# Patient Record
Sex: FEMALE | Race: WHITE
[De-identification: names, ages, dates, MRNs, and addresses within clinical notes are randomized per-mention and may not be internally consistent; named-entity substitution may affect disease eponyms.]

---

## 2017-07-23 NOTE — RAD
INDICATION:  Chest pain.



COMPARISON:  There are no prior studies available for comparison.



TECHNIQUE: Dual-energy PA  and lateral views of the chest were obtained.



FINDINGS:   The heart is within normal limits in size. Mediastinal and hilar contours

appear within normal limits.



The lungs are clear. No pleural effusion is present. 



IMPRESSION:  NO EVIDENCE FOR ACTIVE CARDIOPULMONARY DISEASE.

## 2017-07-23 NOTE — ED
Jeana PUENTES Thomas, scribed for Vicente Banda MD on 07/23/17 at 0944 .





Palpitations / Dysrhythmia





- HPI Summary


HPI Summary: 





The pt is an 82 y/o BIBA c/o palpitations that began this AM. Pt additionally c/

o chest heaviness, HA (severe, onset when she woke up), dizziness (minor), 

diaphoresis, near-syncope, fever (over the past week), and generalized malaise.

  Pt denies SOB and pedal edema (although she did have R foot swelling two 

weeks ago, resolved in the ED). She took a Tylenol at 03:00 today. She is not 

taking any blood thinners. PMHx: A-Fib, anemia, HTN, mitral/aortic valve 

regurgitation. PSHx: T&A. SHx: no smoking, no drinking, no illicit drugs. She 

claims recent tick bites. 





- History of Current Complaint


Chief Complaint: EDDysrhythmPalp


Time Seen by Provider: 07/23/17 09:29


Hx Obtained From: Patient


Onset/Duration: Sudden Onset - this AM when she woke up, Resolved - malaise is 

resolved in the ED


Timing: Constant


Character: Pounding


Aggravating: Nothing


Alleviating: Nothing


Associated Signs & Symptoms: Dizzy - minor, Syncope - near, Diaphoresis





- Allergy/Home Medications


Allergies/Adverse Reactions: 


 Allergies











Allergy/AdvReac Type Severity Reaction Status Date / Time


 


No Known Allergies Allergy   Verified 02/20/13 07:23











Home Medications: 


 Home Medications





Metoprolol Tartrate TAB* [Lopressor TAB*] 25 mg PO DAILY 07/23/17 [History 

Confirmed 07/23/17]











PMH/Surg Hx/FS Hx/Imm Hx


Previously Healthy: No


Endocrine/Hematology History: Reports: Hx Thyroid Disease - THYROIDECTOMY-  

HASHIMOTOS, Hx Anemia - SLIGHT IN THE PAST


Cardiovascular History: Reports: Hx Atrial Fibrillation, Hx Hypertension - ON 

MEDS, Hx Valvular Heart Disease - AORTIC VALVE LEAKAGE, Other Cardiovascular 

Problems/Disorders


Respiratory History: 


   Denies: Other Respiratory Problems/Disorders


Musculoskeletal History: 


   Denies: Hx Osteoporosis


Sensory History: Reports: Hx Contacts or Glasses - GLASSES


   Denies: Hx Hearing Aid


Opthamlomology History: Reports: Hx Contacts or Glasses - GLASSES


Neurological History: 


   Denies: Other Neuro Impairments/Disorders





- Cancer History


Hx Chemotherapy: No


Hx Radiation Therapy: No





- Surgical History


Surgery Procedure, Year, and Place: THYROID LEFT LOBECTOMY, 1990S, [facility] [place].  

UTERINE POLYP REMOVED, 1980S, [facility] [place].  1960S, [place], [place].  1930S, 

TONSILECTOMY, [facility] AND ADENOIDECTOMY


Hx Anesthesia Reactions: No


Infectious Disease History: No


Infectious Disease History: 


   Denies: Traveled Outside the [place] in Last 30 Days





- Family History


Known Family History: Positive: Other - NEG: per neighbor present in room, no MI





- Social History


Alcohol Use: None


Substance Use Type: Reports: None


Smoking Status (MU): Never Smoked Tobacco





Review of Systems


Positive: Fever - over the past week, Skin Diaphoresis, Other - POS: 

generalized malaise


Eyes: Negative


ENT: Negative


Positive: Palpitations - pounding, Chest Pain - heaviness


Respiratory: Negative


Negative: Shortness Of Breath


Gastrointestinal: Negative


Genitourinary: Negative


Musculoskeletal: Negative


Negative: Edema - pedal


Skin: Negative


Neurological: Other - POS: dizziness (minor)


Positive: Headache - severe, onset today, Syncope - near


Psychological: Normal


All Other Systems Reviewed And Are Negative: Yes





Physical Exam





- Summary


Physical Exam Summary: 





VITAL SIGNS: Reviewed. 


GENERAL:  Patient is a well developed and nourished female  who is lying 

comfortable in the stretcher.  Patient is not in any acute respiratory 

distress. 


HEAD AND FACE: No signs of trauma.  No ecchymosis, hematomas or skull 

depressions. No sinus tenderness. 


EYES: PERRLA, EOMI x 2, No injected conjunctiva, no nystagmus.


EARS: Hearing grossly intact. Ear canals and tympanic membranes are within 

normal limits. 


MOUTH: Oropharynx within normal limits. 


NECK: Supple, trachea is midline, no adenopathy, no JVD, no carotid bruit, no c-

spine tenderness, neck with full ROM.


CHEST: Symmetric, no tenderness at palpation 


LUNGS: Clear to auscultation bilaterally. No wheezing or crackles.


CVS: irregular rate and rhythm, S1 and S2 present, no murmurs or gallops 

appreciated. 


ABDOMEN: Soft, non-tender. No signs of distention. No rebound no guarding, and 

no masses palpated. Bowel sounds are normal. 


EXTREMITIES: FROM in all major joints, no edema, no cyanosis or clubbing.


NEURO: Alert and oriented x 3. No acute neurological deficits. Speech is normal 

and follows commands. 


SKIN: Dry and warm





Triage Information Reviewed: Yes


Vital Signs On Initial Exam: 


 Initial Vitals











Temp Pulse Resp Pulse Ox


 


 99.0 F   131   20   99 


 


 07/23/17 09:17  07/23/17 09:17  07/23/17 09:17  07/23/17 09:17











Vital Signs Reviewed: Yes





- Lisette Coma Scale


Coma Scale Total: 15





Diagnostics





- Vital Signs


 Vital Signs











  Temp Pulse Resp BP Pulse Ox


 


 07/23/17 09:22  99.5 F  109  16  104/51  95


 


 07/23/17 09:20   108    95


 


 07/23/17 09:19   109    94


 


 07/23/17 09:17  99.0 F  131  20   99














- Laboratory


Lab Results: 


 Lab Results











  07/23/17 07/23/17 07/23/17 Range/Units





  10:00 10:00 10:00 


 


WBC  9.1    (3.5-10.8)  10^3/ul


 


RBC  4.07    (4.0-5.4)  10^6/ul


 


Hgb  12.9    (12.0-16.0)  g/dl


 


Hct  39    (35-47)  %


 


MCV  96    (80-97)  fL


 


MCH  32 H    (27-31)  pg


 


MCHC  33    (31-36)  g/dl


 


RDW  14    (10.5-15)  %


 


Plt Count  239    (150-450)  10^3/ul


 


MPV  9    (7.4-10.4)  um3


 


Neut % (Auto)  73.5    (38-83)  %


 


Lymph % (Auto)  19.5 L    (25-47)  %


 


Mono % (Auto)  5.9    (1-9)  %


 


Eos % (Auto)  0.5    (0-6)  %


 


Baso % (Auto)  0.6    (0-2)  %


 


Absolute Neuts (auto)  6.7    (1.5-7.7)  10^3/ul


 


Absolute Lymphs (auto)  1.8    (1.0-4.8)  10^3/ul


 


Absolute Monos (auto)  0.5    (0-0.8)  10^3/ul


 


Absolute Eos (auto)  0    (0-0.6)  10^3/ul


 


Absolute Basos (auto)  0.1    (0-0.2)  10^3/ul


 


Absolute Nucleated RBC  0.01    10^3/ul


 


Nucleated RBC %  0.1    


 


INR (Anticoag Therapy)   0.97   (0.89-1.11)  


 


Sodium    138  (133-145)  mmol/L


 


Potassium    3.9  (3.5-5.0)  mmol/L


 


Chloride    103  (101-111)  mmol/L


 


Carbon Dioxide    30  (22-32)  mmol/L


 


Anion Gap    5  (2-11)  mmol/L


 


BUN    22  (6-24)  mg/dL


 


Creatinine    0.93  (0.51-0.95)  mg/dL


 


Est GFR ( Amer)    74.0  (>60)  


 


Est GFR (Non-Af Amer)    57.6  (>60)  


 


BUN/Creatinine Ratio    23.7 H  (8-20)  


 


Glucose    120 H  ()  mg/dL


 


Lactic Acid     (0.5-2.0)  mmol/L


 


Calcium    10.1  (8.6-10.3)  mg/dL


 


Magnesium    1.9  (1.9-2.7)  mg/dL


 


Total Bilirubin    0.70  (0.2-1.0)  mg/dL


 


AST    56 H  (13-39)  U/L


 


ALT    59 H  (7-52)  U/L


 


Alkaline Phosphatase    142 H  ()  U/L


 


Total Creatine Kinase    16  ()  U/L


 


CK-MB (CK-2)    2.2  (0.6-6.3)  ng/mL


 


Troponin I    0.06 H*  (<0.04)  ng/mL


 


B-Natriuretic Peptide    ( - 100) pg/mL


 


Total Protein    6.1 L  (6.4-8.9)  g/dL


 


Albumin    3.2  (3.2-5.2)  g/dL


 


Globulin    2.9  (2-4)  g/dL


 


Albumin/Globulin Ratio    1.1  (1-3)  


 


TSH    4.40  (0.34-5.60)  mcIU/mL














  07/23/17 07/23/17 Range/Units





  10:00 10:00 


 


WBC    (3.5-10.8)  10^3/ul


 


RBC    (4.0-5.4)  10^6/ul


 


Hgb    (12.0-16.0)  g/dl


 


Hct    (35-47)  %


 


MCV    (80-97)  fL


 


MCH    (27-31)  pg


 


MCHC    (31-36)  g/dl


 


RDW    (10.5-15)  %


 


Plt Count    (150-450)  10^3/ul


 


MPV    (7.4-10.4)  um3


 


Neut % (Auto)    (38-83)  %


 


Lymph % (Auto)    (25-47)  %


 


Mono % (Auto)    (1-9)  %


 


Eos % (Auto)    (0-6)  %


 


Baso % (Auto)    (0-2)  %


 


Absolute Neuts (auto)    (1.5-7.7)  10^3/ul


 


Absolute Lymphs (auto)    (1.0-4.8)  10^3/ul


 


Absolute Monos (auto)    (0-0.8)  10^3/ul


 


Absolute Eos (auto)    (0-0.6)  10^3/ul


 


Absolute Basos (auto)    (0-0.2)  10^3/ul


 


Absolute Nucleated RBC    10^3/ul


 


Nucleated RBC %    


 


INR (Anticoag Therapy)    (0.89-1.11)  


 


Sodium    (133-145)  mmol/L


 


Potassium    (3.5-5.0)  mmol/L


 


Chloride    (101-111)  mmol/L


 


Carbon Dioxide    (22-32)  mmol/L


 


Anion Gap    (2-11)  mmol/L


 


BUN    (6-24)  mg/dL


 


Creatinine    (0.51-0.95)  mg/dL


 


Est GFR ( Amer)    (>60)  


 


Est GFR (Non-Af Amer)    (>60)  


 


BUN/Creatinine Ratio    (8-20)  


 


Glucose    ()  mg/dL


 


Lactic Acid  1.1   (0.5-2.0)  mmol/L


 


Calcium    (8.6-10.3)  mg/dL


 


Magnesium    (1.9-2.7)  mg/dL


 


Total Bilirubin    (0.2-1.0)  mg/dL


 


AST    (13-39)  U/L


 


ALT    (7-52)  U/L


 


Alkaline Phosphatase    ()  U/L


 


Total Creatine Kinase    ()  U/L


 


CK-MB (CK-2)    (0.6-6.3)  ng/mL


 


Troponin I    (<0.04)  ng/mL


 


B-Natriuretic Peptide   131 H ( - 100) pg/mL


 


Total Protein    (6.4-8.9)  g/dL


 


Albumin    (3.2-5.2)  g/dL


 


Globulin    (2-4)  g/dL


 


Albumin/Globulin Ratio    (1-3)  


 


TSH    (0.34-5.60)  mcIU/mL











Result Diagrams: 


 07/23/17 10:00





 07/23/17 10:00


Lab Statement: Any lab studies that have been ordered have been reviewed, and 

results considered in the medical decision making process.





- Radiology


  ** CXR


Xray Interpretation: No Acute Changes - No evidence for active cardiopulmonary 

disease


Radiology Interpretation Completed By: Radiologist





- EKG


  ** 09:43


Cardiac Rate: Tachycardia - 115 BPM


EKG Interpretation: A-Fib with RVR





Course/Dx





- Course


Assessment/Plan: The pt is an 82 y/o BIBA c/o palpitations that began this AM. 

Pt additionally c/o chest heaviness, HA (severe, onset when she woke up), 

dizziness (minor), diaphoresis, near-syncope, fever (over the past week), and 

generalized malaise.  Pt denies SOB and pedal edema (although she did have R 

foot swelling two weeks ago, resolved in the ED). She took a Tylenol at 03:00 

today. She is not taking any blood thinners. PMHx: A-Fib, anemia, HTN, mitral/

aortic valve regurgitation. PSHx: T&A. SHx: no smoking, no drinking, no illicit 

drugs. She claims recent tick bites.  Tests are without significant 

abnormalities except Glucose 120, Troponin 0.6, and elevated LFTs.  CXR 

revealed no evidence for acute disease.  The patient is asymptomatic, therefore 

we did not treat with Cardizem. In the ED course her heart rate was  BPM. 

Because of her increased troponin, she was given aspirin. The patient is 

hemodynamically stable and A&Ox3.





- Diagnoses


Differential Diagnosis/HQI/PQRI: Positive: Other - Atrial Fibrillation, NSTEMI, 

ACS, CHF


Provider Diagnoses: 


 Paroxysmal atrial fibrillation with RVR, Increased troponin r/o ACS








- Physician Notifications


Discussed Care Of Patient With: Ej De Jesus


Time Discussed With Above Provider: 11:14


Instructed by Provider To: Other - Discussed patient care. He will accept the 

patient for admission to [facility] [place].





Discharge





- Discharge Plan


Condition: Fair


Disposition: ADMITTED TO [facility] documentation as recorded by the Jeana nava Thomas accurately reflects 

the service I personally performed and the decisions made by me, Vicente Banda MD.

## 2017-07-24 NOTE — ECHO
Patient:      KAYLIN GRAMAJO

[facility] Rec#:     [id number]            :          1933          

Date:         2017            Age:          83y                 

Account#:     [id number]          Height:       160.02 cm / 63.0 in

Accession#:   [id number]           Weight:       63.5 kg / 140.0 lbs

Sex:          F                     BSA:          1.66

Room#:        432                   

Admit Date#:  2017          

Type:         Inpatient

 

Referring:    HANNAH WHITE MD

Reading:      Leno Modi MD

Sonographer:  Silvana MainMATHEW

CC:           Chente Salmon MD

______________________________________________________________________

 

Transthoracic Echocardiogram

 

Indication:

A-fib

BP:           111/60

HR:           68

Rhythm:       NSR

 

Findings     

History:

HTN, Hashimoto's thyroiditis, PFO, recent tick bite. 

 

Technical Comments:

The study quality is good.  Completed at 1530. 

 

Left Ventricle:

The left ventricular chamber size is normal. Mild concentric left

ventricular hypertrophy is observed. Global left ventricular wall motion

and contractility are within normal limits. There is normal left

ventricular systolic function. The estimated ejection fraction is

55-60%.  Abnormal left ventricular diastolic function is observed.

Abnormal left ventricular diastolic filling is observed, consistent with

impaired relaxation.  

 

Left Atrium:

The left atrium is moderately dilated. 

 

Right Ventricle:

Moderator Band present. The right ventricular cavity size is normal. The

right ventricle wall thickness is mildly increased. The right

ventricular global systolic function is normal. 

 

Right Atrium:

The right atrium is mild to moderately dilated.  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

atrial septal aneurysm.  

 

Aortic Valve:

The aortic valve is trileaflet. The aortic valve leaflets are mildly

thickened. Systolic excursion of the aortic valve cusps is reduced.

There is mild aortic regurgitation.  There is borderline aortic stenosis

present. 

 

Mitral Valve:

There is mitral annular calcification. The mitral valve leaflets are

mildly thickened. There is mild mitral regurgitation.  There is no

evidence of mitral stenosis. 

 

Tricuspid Valve:

The tricuspid valve leaflets are normal.  There is mild tricuspid

regurgitation. No pulmonary hypertension is noted. There is no tricuspid

stenosis. 

 

Pulmonic Valve:

The pulmonic valve appears normal. There is mild pulmonic regurgitation.

 There is no pulmonic stenosis.  

 

Pericardium:

A trivial pericardial effusion is visualized. There are no signs of

significant hemodynamic compromise. A pericardial fat pad is visualized.

 

 

Aorta:

There is no dilatation of the ascending aorta. There is no dilatation of

the aortic arch. There is no dilation of the aortic root. 

 

Pulmonary Artery:

The main pulmonary artery appears normal. 

 

Venous:

The inferior vena cava is dilated.  There is a greater than 50%

respiratory change in the inferior vena cava dimension. 

 

Conclusions

Global left ventricular wall motion and contractility are within normal

limits.

There is normal left ventricular systolic function.

The estimated ejection fraction is 55-60%. 

Abnormal left ventricular diastolic filling is observed, consistent with

impaired relaxation. 

The right ventricular global systolic function is normal.

There is mild aortic regurgitation. 

There is borderline aortic stenosis present.

There is mild mitral regurgitation. 

There is mild tricuspid regurgitation.

No pulmonary hypertension is noted.

A trivial pericardial effusion is visualized.

There are no signs of significant hemodynamic compromise.

 

Measurements     

Name                    Value         Normal Range            

RVIDd (AP) 2D           3 cm          (0.9 - 2.6)             

RVDdMajor (2D)          3.6 cm        (2.2 - 4.4)             

RVAW (2D)               0.7 cm        (0.2 - 0.5)             

RAd ISD 4CH             5.3 cm        (3.4 - 4.9)             

RA (A4C)W               4.4 cm        (2.9 - 4.6)             

IVSd (2D)               1.1 cm        (0.6 - 1)               

LVPWd (2D)              1.1 cm        (0.6 - 1)               

LVIDd (2D)              4.7 cm        (3.6 - 5.4)             

LVIDs (2D)              3 cm          -                        

LV FS (2D)              36 %          (25 - 45)               

Aortic Annulus          1.9 cm        (1.4 - 2.6)             

Ao root diameter (2D)   3 cm          (2.1 - 3.5)             

Ascending Ao            2.9 cm        (2.1 - 3.4)             

Aortic arch             2.8 cm        (1.8 - 3.4)             

LA dimension (AP) 2D    3.3 cm        (2.3 - 3.8)             

LAd ISD 4CH             5.6 cm        (2.9 - 5.3)             

LA ISD 4CH W            4.8 cm        (2.5 - 4.5)             

 

Name                    Value         Normal Range            

LA ESV SP 4CH (A/L)     75 ml         -                        

LA ESV SP 2CH (A/L)     61 ml         -                        

LA ESV BP (A/L)         68 ml         -                        

LA ESV BP (A/L) index   41 ml/m2      -                        

LA ESV SP 4CH (MOD)     69 ml         -                        

LA ESV SP 2CH (MOD)     59 ml         -                        

 

Name                    Value         Normal Range            

MV E-wave Vmax          0.94 m/sec    -                        

MV deceleration time    216 msec      -                        

MV A-wave Vmax          1.23 m/sec    -                        

MV E:A ratio            0.76 ratio    -                        

LV septal e' Vmax       0.05 m/sec    -                        

LV lateral e' Vmax      0.06 m/sec    -                        

LV E:e' septal ratio    18.8 ratio    -                        

LV E:e' lateral ratio   15.67 ratio   -                        

 

Name                    Value         Normal Range            

AV Vmax                 2.04 m/sec    -                        

AV VTI                  43.7 cm       -                        

AV peak gradient        16.7 mmHg     -                        

AV mean gradient        9.12 mmHg     -                        

LVOT diameter           2 cm          -                        

LVOT Vmax               1.47 m/sec    -                        

LVOT VTI                33.1 cm       -                        

LVOT peak gradient      8.69 mmHg     -                        

LVOT mean gradient      5.83 mmHg     -                        

DOI (VTI)               0.78 ratio    -                        

PETRA (continuity Vmax)   2.26 cm2      -                        

PETRA (continuity VTI)    2.38 cm2      -                        

AR PHT                  342 msec      -                        

AR peak gradient        47.98 mmHg    -                        

 

Name                    Value         Normal Range            

TR Vmax                 2.2 m/sec     -                        

TR peak gradient        19 mmHg       -                        

RAP                     15 mmHg       -                        

RVSP                    34 mmHg       -                        

IVC diameter            2.5 cm        -                        

 

Name                    Value         Normal Range            

PV Vmax                 0.97 m/sec    -                        

PV peak gradient        3.73 mmHg     -                        

WI end-diastolic Vmax   0.89 m/sec    -                        

 

Electronically signed by: Leno Modi MD on 2017 16:41:20

## 2017-07-24 NOTE — PN
Subjective


Date of Service: 07/24/17


Interval History: 





No palpitations or chest pressure since admission.  No new c/o.  She never had 

theses sx's before.  Her headache seems to have gone also.





Objective


Active Medications: 








Acetaminophen (Tylenol Tab*)  650 mg PO Q4H PRN


   PRN Reason: Pain/fever


   Last Admin: 07/24/17 08:54 Dose:  650 mg


Amlodipine Besylate (Norvasc Tab*)  2.5 mg PO BEDTIME [facility]


   Last Admin: 07/23/17 19:47 Dose:  2.5 mg


Aspirin (Aspirin Low Dose Tab*)  81 mg PO BEDTIME [facility]


   Last Admin: 07/23/17 19:48 Dose:  81 mg


Enoxaparin Sodium (Lovenox(*))  40 mg SUBCUT Q24H [facility]


   Last Admin: 07/24/17 11:49 Dose:  40 mg


Metoprolol Tartrate (Lopressor Tab*)  25 mg PO DAILY [facility]


   Last Admin: 07/24/17 08:55 Dose:  25 mg


Multivitamins (Theragran Tab*)  1 tab PO BEDTIME [facility]


   Last Admin: 07/23/17 19:47 Dose:  1 tab








 Vital Signs











  07/23/17 07/23/17 07/23/17





  14:10 14:20 14:30


 


Temperature   


 


Pulse Rate   


 


Respiratory 10 12 65





Rate   


 


Blood Pressure   





(mmHg)   


 


O2 Sat by Pulse   





Oximetry   














  07/23/17 07/23/17 07/23/17





  14:40 14:50 15:00


 


Temperature   


 


Pulse Rate   


 


Respiratory 15 0 30





Rate   


 


Blood Pressure   





(mmHg)   


 


O2 Sat by Pulse   





Oximetry   














  07/23/17 07/23/17 07/23/17





  15:10 15:20 15:30


 


Temperature   


 


Pulse Rate   


 


Respiratory 17 15 21





Rate   


 


Blood Pressure   





(mmHg)   


 


O2 Sat by Pulse   





Oximetry   














  07/23/17 07/23/17 07/23/17





  15:31 15:40 15:50


 


Temperature 99.0 F  


 


Pulse Rate 70  


 


Respiratory 16 12 20





Rate   


 


Blood Pressure 97/53  





(mmHg)   


 


O2 Sat by Pulse 97  





Oximetry   














  07/23/17 07/23/17 07/23/17





  16:00 16:10 16:20


 


Temperature   


 


Pulse Rate   


 


Respiratory 8 3 5





Rate   


 


Blood Pressure   





(mmHg)   


 


O2 Sat by Pulse   





Oximetry   














  07/23/17 07/23/17 07/23/17





  16:30 16:40 16:50


 


Temperature   


 


Pulse Rate   


 


Respiratory 0 0 15





Rate   


 


Blood Pressure   





(mmHg)   


 


O2 Sat by Pulse   





Oximetry   














  07/23/17 07/23/17 07/23/17





  17:00 17:10 17:20


 


Temperature   


 


Pulse Rate   


 


Respiratory 11 17 13





Rate   


 


Blood Pressure   





(mmHg)   


 


O2 Sat by Pulse   





Oximetry   














  07/23/17 07/23/17 07/23/17





  17:30 17:40 17:50


 


Temperature   


 


Pulse Rate   


 


Respiratory 7 12 15





Rate   


 


Blood Pressure   





(mmHg)   


 


O2 Sat by Pulse   





Oximetry   














  07/23/17 07/23/17 07/23/17





  18:00 18:10 18:20


 


Temperature   


 


Pulse Rate   


 


Respiratory 0 0 0





Rate   


 


Blood Pressure   





(mmHg)   


 


O2 Sat by Pulse   





Oximetry   














  07/23/17 07/23/17 07/23/17





  18:30 18:40 18:50


 


Temperature   


 


Pulse Rate   


 


Respiratory 16 29 9





Rate   


 


Blood Pressure   





(mmHg)   


 


O2 Sat by Pulse   





Oximetry   














  07/23/17 07/23/17 07/23/17





  19:00 19:10 19:20


 


Temperature   


 


Pulse Rate   


 


Respiratory 9 27 4





Rate   


 


Blood Pressure   





(mmHg)   


 


O2 Sat by Pulse   





Oximetry   














  07/23/17 07/23/17 07/23/17





  19:29 19:30 19:40


 


Temperature 100.1 F  


 


Pulse Rate 78  


 


Respiratory 16 17 13





Rate   


 


Blood Pressure 105/47  





(mmHg)   


 


O2 Sat by Pulse 95  





Oximetry   














  07/23/17 07/23/17 07/23/17





  19:50 20:00 20:10


 


Temperature   


 


Pulse Rate   


 


Respiratory 17 18 13





Rate   


 


Blood Pressure   





(mmHg)   


 


O2 Sat by Pulse   





Oximetry   














  07/23/17 07/23/17 07/23/17





  20:20 20:30 20:39


 


Temperature   


 


Pulse Rate   78


 


Respiratory 28 0 





Rate   


 


Blood Pressure   105/47





(mmHg)   


 


O2 Sat by Pulse   





Oximetry   














  07/23/17 07/23/17 07/23/17





  20:40 20:43 20:50


 


Temperature   


 


Pulse Rate  85 


 


Respiratory 0  0





Rate   


 


Blood Pressure  108/48 





(mmHg)   


 


O2 Sat by Pulse   





Oximetry   














  07/23/17 07/23/17 07/23/17





  21:00 21:10 21:20


 


Temperature   


 


Pulse Rate 94  


 


Respiratory 13 18 20





Rate   


 


Blood Pressure 106/49  





(mmHg)   


 


O2 Sat by Pulse   





Oximetry   














  07/23/17 07/23/17 07/23/17





  21:30 21:40 21:50


 


Temperature   


 


Pulse Rate   


 


Respiratory 16 21 22





Rate   


 


Blood Pressure   





(mmHg)   


 


O2 Sat by Pulse   





Oximetry   














  07/23/17 07/23/17 07/23/17





  22:00 22:10 22:16


 


Temperature   99.7 F


 


Pulse Rate   


 


Respiratory 15 19 





Rate   


 


Blood Pressure   





(mmHg)   


 


O2 Sat by Pulse   





Oximetry   














  07/23/17 07/23/17 07/23/17





  22:20 22:30 22:40


 


Temperature   


 


Pulse Rate   


 


Respiratory 17 16 9





Rate   


 


Blood Pressure   





(mmHg)   


 


O2 Sat by Pulse   





Oximetry   














  07/23/17 07/23/17 07/23/17





  22:50 23:00 23:10


 


Temperature   


 


Pulse Rate   


 


Respiratory 14 38 16





Rate   


 


Blood Pressure   





(mmHg)   


 


O2 Sat by Pulse   





Oximetry   














  07/23/17 07/23/17 07/23/17





  23:20 23:30 23:40


 


Temperature   


 


Pulse Rate   


 


Respiratory 7 10 15





Rate   


 


Blood Pressure   





(mmHg)   


 


O2 Sat by Pulse   





Oximetry   














  07/23/17 07/23/17 07/23/17





  23:45 23:50 23:55


 


Temperature 99.4 F  


 


Pulse Rate 71  


 


Respiratory 16 18 17





Rate   


 


Blood Pressure 107/45  





(mmHg)   


 


O2 Sat by Pulse 93  





Oximetry   














  07/24/17 07/24/17 07/24/17





  00:00 00:10 00:20


 


Temperature   


 


Pulse Rate   


 


Respiratory 15 0 0





Rate   


 


Blood Pressure   





(mmHg)   


 


O2 Sat by Pulse   





Oximetry   














  07/24/17 07/24/17 07/24/17





  00:30 00:40 00:50


 


Temperature   


 


Pulse Rate   


 


Respiratory 0 0 0





Rate   


 


Blood Pressure   





(mmHg)   


 


O2 Sat by Pulse   





Oximetry   














  07/24/17 07/24/17 07/24/17





  01:00 01:10 01:20


 


Temperature   


 


Pulse Rate   


 


Respiratory 0 0 0





Rate   


 


Blood Pressure   





(mmHg)   


 


O2 Sat by Pulse   





Oximetry   














  07/24/17 07/24/17 07/24/17





  01:30 01:40 01:50


 


Temperature   


 


Pulse Rate   


 


Respiratory 13 17 2





Rate   


 


Blood Pressure   





(mmHg)   


 


O2 Sat by Pulse   





Oximetry   














  07/24/17 07/24/17 07/24/17





  02:00 02:10 02:20


 


Temperature   


 


Pulse Rate   


 


Respiratory 14 14 14





Rate   


 


Blood Pressure   





(mmHg)   


 


O2 Sat by Pulse   





Oximetry   














  07/24/17 07/24/17 07/24/17





  02:30 02:40 02:50


 


Temperature   


 


Pulse Rate   


 


Respiratory 12 4 0





Rate   


 


Blood Pressure   





(mmHg)   


 


O2 Sat by Pulse   





Oximetry   














  07/24/17 07/24/17 07/24/17





  03:00 03:10 03:20


 


Temperature   


 


Pulse Rate   


 


Respiratory 0 0 13





Rate   


 


Blood Pressure   





(mmHg)   


 


O2 Sat by Pulse   





Oximetry   














  07/24/17 07/24/17 07/24/17





  03:22 03:30 03:40


 


Temperature 98.8 F  


 


Pulse Rate 66  


 


Respiratory 16 12 16





Rate   


 


Blood Pressure 103/48  





(mmHg)   


 


O2 Sat by Pulse 96  





Oximetry   














  07/24/17 07/24/17 07/24/17





  03:50 04:00 04:10


 


Temperature   


 


Pulse Rate   


 


Respiratory 12 11 13





Rate   


 


Blood Pressure   





(mmHg)   


 


O2 Sat by Pulse   





Oximetry   














  07/24/17 07/24/17 07/24/17





  04:20 04:30 04:40


 


Temperature   


 


Pulse Rate   


 


Respiratory 14 11 10





Rate   


 


Blood Pressure   





(mmHg)   


 


O2 Sat by Pulse   





Oximetry   














  07/24/17 07/24/17 07/24/17





  04:50 05:00 05:10


 


Temperature   


 


Pulse Rate   


 


Respiratory 9 14 0





Rate   


 


Blood Pressure   





(mmHg)   


 


O2 Sat by Pulse   





Oximetry   














  07/24/17 07/24/17 07/24/17





  05:20 05:30 05:40


 


Temperature   


 


Pulse Rate   


 


Respiratory 15 16 0





Rate   


 


Blood Pressure   





(mmHg)   


 


O2 Sat by Pulse   





Oximetry   














  07/24/17 07/24/17 07/24/17





  05:50 06:00 06:10


 


Temperature   


 


Pulse Rate   


 


Respiratory 0 0 0





Rate   


 


Blood Pressure   





(mmHg)   


 


O2 Sat by Pulse   





Oximetry   














  07/24/17 07/24/17 07/24/17





  06:20 06:30 06:40


 


Temperature   


 


Pulse Rate   


 


Respiratory 0 0 4





Rate   


 


Blood Pressure   





(mmHg)   


 


O2 Sat by Pulse   





Oximetry   














  07/24/17 07/24/17 07/24/17





  06:50 07:00 07:10


 


Temperature   


 


Pulse Rate   


 


Respiratory 4 4 17





Rate   


 


Blood Pressure   





(mmHg)   


 


O2 Sat by Pulse   





Oximetry   














  07/24/17 07/24/17 07/24/17





  07:20 07:30 07:40


 


Temperature  98.0 F 


 


Pulse Rate  69 


 


Respiratory 14 18 14





Rate   


 


Blood Pressure  100/53 





(mmHg)   


 


O2 Sat by Pulse  94 





Oximetry   














  07/24/17 07/24/17 07/24/17





  07:50 08:00 08:10


 


Temperature   


 


Pulse Rate   


 


Respiratory 16 16 20





Rate   


 


Blood Pressure   





(mmHg)   


 


O2 Sat by Pulse   





Oximetry   














  07/24/17 07/24/17 07/24/17





  08:20 08:30 08:40


 


Temperature   


 


Pulse Rate   


 


Respiratory 21 12 19





Rate   


 


Blood Pressure   





(mmHg)   


 


O2 Sat by Pulse   





Oximetry   














  07/24/17 07/24/17 07/24/17





  08:50 09:00 09:10


 


Temperature   


 


Pulse Rate   


 


Respiratory 15 9 30





Rate   


 


Blood Pressure   





(mmHg)   


 


O2 Sat by Pulse   





Oximetry   














  07/24/17 07/24/17 07/24/17





  09:20 09:30 09:40


 


Temperature   


 


Pulse Rate   


 


Respiratory 13 10 11





Rate   


 


Blood Pressure   





(mmHg)   


 


O2 Sat by Pulse   





Oximetry   














  07/24/17 07/24/17 07/24/17





  09:50 10:00 10:10


 


Temperature   


 


Pulse Rate   


 


Respiratory 12 10 11





Rate   


 


Blood Pressure   





(mmHg)   


 


O2 Sat by Pulse   





Oximetry   














  07/24/17 07/24/17 07/24/17





  10:20 10:30 10:40


 


Temperature   


 


Pulse Rate   


 


Respiratory 12 1 13





Rate   


 


Blood Pressure   





(mmHg)   


 


O2 Sat by Pulse   





Oximetry   














  07/24/17 07/24/17 07/24/17





  10:50 11:00 11:06


 


Temperature   97.4 F


 


Pulse Rate   67


 


Respiratory 0 13 16





Rate   


 


Blood Pressure   111/60





(mmHg)   


 


O2 Sat by Pulse   99





Oximetry   














  07/24/17 07/24/17 07/24/17





  11:10 11:20 11:30


 


Temperature   


 


Pulse Rate   


 


Respiratory 14 15 17





Rate   


 


Blood Pressure   





(mmHg)   


 


O2 Sat by Pulse   





Oximetry   














  07/24/17





  11:40


 


Temperature 


 


Pulse Rate 


 


Respiratory 13





Rate 


 


Blood Pressure 





(mmHg) 


 


O2 Sat by Pulse 





Oximetry 











Oxygen Devices in Use Now: None


Appearance: Alert, sitting up in bed.  In good spirits.  Looks comfortable.


Eyes: No Scleral Icterus


Neck: NL Appearance and Movements; NL JVP, No Thyroid Enlargement, Masses


Respiratory: Symmetrical Chest Expansion and Respiratory Effort, Clear to 

Auscultation, Clear to Percussion


Cardiovascular: RRR, No Edema, - - 1-2/6 systolic murmur L>R


Abdominal: NL Sounds; No Tenderness; No Distention, No Hepatosplenomegaly


Extremities: No Edema, No Clubbing, Cyanosis


Skin: No Rash or Ulcers, No Nodules or Sclerosis


Neurological: Alert and Oriented x 3, NL Sensation


Result Diagrams: 


 07/23/17 10:00





 07/24/17 05:23


Additional Lab and Data: 


 Lab Results











  07/23/17 07/23/17 07/23/17 Range/Units





  10:00 10:00 10:00 


 


WBC  9.1    (3.5-10.8)  10^3/ul


 


RBC  4.07    (4.0-5.4)  10^6/ul


 


Hgb  12.9    (12.0-16.0)  g/dl


 


Hct  39    (35-47)  %


 


MCV  96    (80-97)  fL


 


MCH  32 H    (27-31)  pg


 


MCHC  33    (31-36)  g/dl


 


RDW  14    (10.5-15)  %


 


Plt Count  239    (150-450)  10^3/ul


 


MPV  9    (7.4-10.4)  um3


 


Neut % (Auto)  73.5    (38-83)  %


 


Lymph % (Auto)  19.5 L    (25-47)  %


 


Mono % (Auto)  5.9    (1-9)  %


 


Eos % (Auto)  0.5    (0-6)  %


 


Baso % (Auto)  0.6    (0-2)  %


 


Absolute Neuts (auto)  6.7    (1.5-7.7)  10^3/ul


 


Absolute Lymphs (auto)  1.8    (1.0-4.8)  10^3/ul


 


Absolute Monos (auto)  0.5    (0-0.8)  10^3/ul


 


Absolute Eos (auto)  0    (0-0.6)  10^3/ul


 


Absolute Basos (auto)  0.1    (0-0.2)  10^3/ul


 


Absolute Nucleated RBC  0.01    10^3/ul


 


Nucleated RBC %  0.1    


 


INR (Anticoag Therapy)   0.97   (0.89-1.11)  


 


Sodium    138  (133-145)  mmol/L


 


Potassium    3.9  (3.5-5.0)  mmol/L


 


Chloride    103  (101-111)  mmol/L


 


Carbon Dioxide    30  (22-32)  mmol/L


 


Anion Gap    5  (2-11)  mmol/L


 


BUN    22  (6-24)  mg/dL


 


Creatinine    0.93  (0.51-0.95)  mg/dL


 


Est GFR ( Amer)    74.0  (>60)  


 


Est GFR (Non-Af Amer)    57.6  (>60)  


 


BUN/Creatinine Ratio    23.7 H  (8-20)  


 


Glucose    120 H  ()  mg/dL


 


Lactic Acid     (0.5-2.0)  mmol/L


 


Calcium    10.1  (8.6-10.3)  mg/dL


 


Magnesium    1.9  (1.9-2.7)  mg/dL


 


Total Bilirubin    0.70  (0.2-1.0)  mg/dL


 


AST    56 H  (13-39)  U/L


 


ALT    59 H  (7-52)  U/L


 


Alkaline Phosphatase    142 H  ()  U/L


 


Total Creatine Kinase    16  ()  U/L


 


CK-MB (CK-2)    2.2  (0.6-6.3)  ng/mL


 


Troponin I    0.06 H*  (<0.04)  ng/mL


 


B-Natriuretic Peptide    ( - 100) pg/mL


 


Total Protein    6.1 L  (6.4-8.9)  g/dL


 


Albumin    3.2  (3.2-5.2)  g/dL


 


Globulin    2.9  (2-4)  g/dL


 


Albumin/Globulin Ratio    1.1  (1-3)  


 


TSH    4.40  (0.34-5.60)  mcIU/mL














  07/23/17 07/23/17 Range/Units





  10:00 10:00 


 


WBC    (3.5-10.8)  10^3/ul


 


RBC    (4.0-5.4)  10^6/ul


 


Hgb    (12.0-16.0)  g/dl


 


Hct    (35-47)  %


 


MCV    (80-97)  fL


 


MCH    (27-31)  pg


 


MCHC    (31-36)  g/dl


 


RDW    (10.5-15)  %


 


Plt Count    (150-450)  10^3/ul


 


MPV    (7.4-10.4)  um3


 


Neut % (Auto)    (38-83)  %


 


Lymph % (Auto)    (25-47)  %


 


Mono % (Auto)    (1-9)  %


 


Eos % (Auto)    (0-6)  %


 


Baso % (Auto)    (0-2)  %


 


Absolute Neuts (auto)    (1.5-7.7)  10^3/ul


 


Absolute Lymphs (auto)    (1.0-4.8)  10^3/ul


 


Absolute Monos (auto)    (0-0.8)  10^3/ul


 


Absolute Eos (auto)    (0-0.6)  10^3/ul


 


Absolute Basos (auto)    (0-0.2)  10^3/ul


 


Absolute Nucleated RBC    10^3/ul


 


Nucleated RBC %    


 


INR (Anticoag Therapy)    (0.89-1.11)  


 


Sodium    (133-145)  mmol/L


 


Potassium    (3.5-5.0)  mmol/L


 


Chloride    (101-111)  mmol/L


 


Carbon Dioxide    (22-32)  mmol/L


 


Anion Gap    (2-11)  mmol/L


 


BUN    (6-24)  mg/dL


 


Creatinine    (0.51-0.95)  mg/dL


 


Est GFR ( Amer)    (>60)  


 


Est GFR (Non-Af Amer)    (>60)  


 


BUN/Creatinine Ratio    (8-20)  


 


Glucose    ()  mg/dL


 


Lactic Acid  1.1   (0.5-2.0)  mmol/L


 


Calcium    (8.6-10.3)  mg/dL


 


Magnesium    (1.9-2.7)  mg/dL


 


Total Bilirubin    (0.2-1.0)  mg/dL


 


AST    (13-39)  U/L


 


ALT    (7-52)  U/L


 


Alkaline Phosphatase    ()  U/L


 


Total Creatine Kinase    ()  U/L


 


CK-MB (CK-2)    (0.6-6.3)  ng/mL


 


Troponin I    (<0.04)  ng/mL


 


B-Natriuretic Peptide   131 H ( - 100) pg/mL


 


Total Protein    (6.4-8.9)  g/dL


 


Albumin    (3.2-5.2)  g/dL


 


Globulin    (2-4)  g/dL


 


Albumin/Globulin Ratio    (1-3)  


 


TSH    (0.34-5.60)  mcIU/mL














Assess/Plan/Problems-Billing


Assessment: 











- Patient Problems


(1) PAF (paroxysmal atrial fibrillation)


Current Visit: Yes   Status: Acute   Code(s): I48.0 - PAROXYSMAL ATRIAL 

FIBRILLATION   SNOMED Code(s): 239489502


   Comment: TSH nl.  Echo showed nl LVEF, mild MR, mod LA dilatation.  ? atrial 

fib provoked by her febril illness.  Note temp 100.1 PM 7/23.     





(2) HTN (hypertension)


Current Visit: Yes   Status: Acute   Code(s): I10 - ESSENTIAL (PRIMARY) 

HYPERTENSION   SNOMED Code(s): 37133650


   Comment: Stop amlodipine due to relatively low BP.    


Status and Disposition: 





Discharge now.  Fup Teri Regalado.

## 2017-07-25 NOTE — DS
CC:  Dr. Abisai Salmon; Dr. Williamson *

 

DISCHARGE SUMMARY:

 

DATE OF ADMISSION:

 

DATE OF DISCHARGE:  07/24/17

 

HISTORY:  This 83-year-old woman presented with palpitations and chest 
heaviness. She had some flu-like symptoms.  She had noticed a tick under her 
right breast about July 3rd or July 4th, which she removed.  Symptoms started a 
few days before admission.  She had a Lyme serology sent, all of which was 
negative.  She had some palpitations on the morning of admission.  She had 
atrial fibrillation with a ventricular rate about 115.  She spontaneously 
converted in the emergency room.

 

She was monitored on telemetry.  She had no further episodes of arrhythmia.  
Her temperature on the evening of the first hospital day was 100.1.  It was 
under 100 after that.  She felt much better.  She had an echocardiogram which 
showed left ventricular ejection fraction was normal.  There was mild mitral 
regurgitation and moderate left atrial dilatation.  Her TSH was within normal 
limits.  Other routine labs were unremarkable other than the creatinine of 
1.04.  Her troponin was minimally elevated with a peak of 0.09.

 

She was told that she needed to be monitored as an outpatient to see if she has 
any other episodes of atrial fibrillation.  This one was likely precipitated by 
her febrile infectious illness, which is now resolved.

 

She was told to stop taking amlodipine as her blood pressure here had the 
highest diastolic was 60 and the highest systolic was 111 during her whole stay 
here.

 

FINAL DIAGNOSES:

1.  Paroxysmal atrial fibrillation.

2.  Febrile illness, likely viral.

3.  History of tick bite with negative Lyme serology.

4.  Hypertension. 



DISCHARGE MEDICATIONS:

1.  Multivitamin 1 daily.

2.  Calcium citrate with vitamin D 1 daily.

3.  Aspirin 81 mg h.s.

4.  Metoprolol tartrate 25 mg daily.

 

 [id number]/CPS #: [id number]

[facility]

## 2017-07-29 NOTE — RAD
HISTORY: Pulmonary embolism



TECHNIQUE: Multiple transverse and longitudinal ultrasound images were obtained of the

veins of the bilateral lower extremities using grayscale, color Doppler, and spectral

Doppler imaging with and without compression and with augmentation. 



FINDINGS:



VEINS: The common femoral vein, deep femoral vein, femoral vein and popliteal vein are

compressible throughout their course, with normal flow on color Doppler imaging and normal

response to augmentation on spectral Doppler imaging.



The deeper of the peroneal right peroneal artery exhibits accident color flow with

echogenic material in the lumen. Similarly, there is absence of flow in the left peroneal

artery is well.



SOFT TISSUES: Grossly normal. No large popliteal fossa cyst was identified.



IMPRESSION: 

1. No sonographic evidence of femoropopliteal deep vein thrombosis.

2. Occlusive thrombus is identified in the bilateral peroneal veins.

## 2017-07-29 NOTE — ED
Monica PUENTES Alfonso, scribed for Ricardo Rabago on 07/29/17 at 0458 .





Complex/Multi-Sys Presentation





- HPI Summary


HPI Summary: 


This patient is an 83 year old F BIBA to [facility] with a chief complaint of CP 

since 0230. The CC is described as heaviness. The pain awoke her from sleep. Pt 

rates the pain 1/10 in severity. Symptoms aggravated and alleviated by nothing. 

Patient took ASA and Tylenol PTA which did not relieve her symptoms. Pt reports 

upper back pain. PMHx of A-Fib and HTN.





- History Of Current Complaint


Chief Complaint: EDChestPainROMI


Time Seen by Provider: 07/29/17 04:49


Hx Obtained From: Patient


Onset/Duration: Sudden Onset, Lasting Hours - since 0230 this morning, Still 

Present


Timing: Constant


Severity Currently: Moderate


Severity Initially: Moderate


Location: Pain At: - Upper back and chest


Character: Pressure - Heaviness


Aggravating Factor(s): Nothing.


Alleviating Factor(s): Nothing.


Associated Signs And Symptoms: Positive: Back Pain - Upper





- Allergies/Home Medications


Allergies/Adverse Reactions: 


 Allergies











Allergy/AdvReac Type Severity Reaction Status Date / Time


 


No Known Allergies Allergy   Verified 02/20/13 07:23














PMH/Surg Hx/FS Hx/Imm Hx


Endocrine/Hematology History: Reports: Hx Thyroid Disease - THYROIDECTOMY-  

HASHIMOTOS, Hx Anemia - SLIGHT IN THE PAST


Cardiovascular History: Reports: Hx Atrial Fibrillation, Hx Hypertension - ON 

MEDS, Hx Valvular Heart Disease - AORTIC VALVE LEAKAGE, Other Cardiovascular 

Problems/Disorders


Respiratory History: 


   Denies: Other Respiratory Problems/Disorders


Musculoskeletal History: 


   Denies: Hx Osteoporosis


Sensory History: Reports: Hx Contacts or Glasses - GLASSES


   Denies: Hx Hearing Aid


Opthamlomology History: Reports: Hx Contacts or Glasses - GLASSES


Neurological History: 


   Denies: Other Neuro Impairments/Disorders





- Cancer History


Hx Chemotherapy: No


Hx Radiation Therapy: No





- Surgical History


Surgery Procedure, Year, and Place: [place] [facility] [place]  

[place] [facility] [place]  [place].  1930S, 

TONSILECTOMY, [facility] AND ADENOIDECTOMY


Hx Anesthesia Reactions: No


Infectious Disease History: No


Infectious Disease History: 


   Denies: Traveled Outside the [place] in Last 30 Days





- Family History


Known Family History: Positive: Other - NEG: per neighbor present in room, no MI





- Social History


Alcohol Use: None


Substance Use Type: Reports: None


Smoking Status (MU): Never Smoked Tobacco





Review of Systems


Positive: Chest Pain


Positive: Other - Positive upper back pain


All Other Systems Reviewed And Are Negative: Yes





Physical Exam


Triage Information Reviewed: Yes


Vital Signs On Initial Exam: 


 Initial Vitals











Temp Pulse Resp BP Pulse Ox


 


 97.3 F   62   14   126/84   97 


 


 07/29/17 04:46  07/29/17 04:46  07/29/17 04:46  07/29/17 04:46  07/29/17 04:46











Vital Signs Reviewed: Yes


Appearance: Positive: Well-Appearing, No Pain Distress


Skin: Positive: Warm, Skin Color Reflects Adequate Perfusion, Dry


Head/Face: Positive: Normal Head/Face Inspection


Eyes: Positive: EOMI, JEREMY


ENT: Positive: Normal ENT inspection


Neck: Positive: Supple, Nontender


Respiratory/Lung Sounds: Positive: Clear to Auscultation, Breath Sounds Present


Cardiovascular: Positive: Pulses are Symmetrical in both Upper and Lower 

Extremities, IRR


Abdomen Description: Positive: Nontender, Soft


Bowel Sounds: Positive: Present


Musculoskeletal: Positive: Normal, Strength/ROM Intact


Neurological: Positive: Normal, Sensory/Motor Intact, Alert, Oriented to Person 

Place, Time





- Lisette Coma Scale


Coma Scale Total: 15





Diagnostics





- Vital Signs


 Vital Signs











  Temp Pulse Resp BP Pulse Ox


 


 07/29/17 04:46  97.3 F  62  14  126/84  97














- Laboratory


Lab Results: 


 Lab Results











  07/29/17 07/29/17 07/29/17 Range/Units





  05:01 05:01 05:01 


 


WBC  9.9    (3.5-10.8)  10^3/ul


 


RBC  3.48 L    (4.0-5.4)  10^6/ul


 


Hgb  11.0 L    (12.0-16.0)  g/dl


 


Hct  33 L    (35-47)  %


 


MCV  95    (80-97)  fL


 


MCH  32 H    (27-31)  pg


 


MCHC  33    (31-36)  g/dl


 


RDW  14    (10.5-15)  %


 


Plt Count  324    (150-450)  10^3/ul


 


MPV  9    (7.4-10.4)  um3


 


Neut % (Auto)  56.4    (38-83)  %


 


Lymph % (Auto)  36.1    (25-47)  %


 


Mono % (Auto)  5.6    (1-9)  %


 


Eos % (Auto)  1.6    (0-6)  %


 


Baso % (Auto)  0.3    (0-2)  %


 


Absolute Neuts (auto)  5.6    (1.5-7.7)  10^3/ul


 


Absolute Lymphs (auto)  3.6    (1.0-4.8)  10^3/ul


 


Absolute Monos (auto)  0.6    (0-0.8)  10^3/ul


 


Absolute Eos (auto)  0.2    (0-0.6)  10^3/ul


 


Absolute Basos (auto)  0    (0-0.2)  10^3/ul


 


Absolute Nucleated RBC  0    10^3/ul


 


Nucleated RBC %  0    


 


INR (Anticoag Therapy)   0.99   (0.89-1.11)  


 


APTT   29.8   (26.0-36.3)  seconds


 


Sodium    131 L  (133-145)  mmol/L


 


Potassium    3.8  (3.5-5.0)  mmol/L


 


Chloride    99 L  (101-111)  mmol/L


 


Carbon Dioxide    26  (22-32)  mmol/L


 


Anion Gap    6  (2-11)  mmol/L


 


BUN    23  (6-24)  mg/dL


 


Creatinine    0.92  (0.51-0.95)  mg/dL


 


Est GFR ( Amer)    75.0  (>60)  


 


Est GFR (Non-Af Amer)    58.3  (>60)  


 


BUN/Creatinine Ratio    25.0 H  (8-20)  


 


Glucose    115 H  ()  mg/dL


 


Lactic Acid     (0.5-2.0)  mmol/L


 


Calcium    9.6  (8.6-10.3)  mg/dL


 


Total Bilirubin    0.50  (0.2-1.0)  mg/dL


 


AST    44 H  (13-39)  U/L


 


ALT    78 H  (7-52)  U/L


 


Alkaline Phosphatase    135 H  ()  U/L


 


Troponin I    0.02  (<0.04)  ng/mL


 


B-Natriuretic Peptide    ( - 100) pg/mL


 


Total Protein    6.0 L  (6.4-8.9)  g/dL


 


Albumin    3.0 L  (3.2-5.2)  g/dL


 


Globulin    3.0  (2-4)  g/dL


 


Albumin/Globulin Ratio    1.0  (1-3)  














  07/29/17 07/29/17 Range/Units





  05:01 05:01 


 


WBC    (3.5-10.8)  10^3/ul


 


RBC    (4.0-5.4)  10^6/ul


 


Hgb    (12.0-16.0)  g/dl


 


Hct    (35-47)  %


 


MCV    (80-97)  fL


 


MCH    (27-31)  pg


 


MCHC    (31-36)  g/dl


 


RDW    (10.5-15)  %


 


Plt Count    (150-450)  10^3/ul


 


MPV    (7.4-10.4)  um3


 


Neut % (Auto)    (38-83)  %


 


Lymph % (Auto)    (25-47)  %


 


Mono % (Auto)    (1-9)  %


 


Eos % (Auto)    (0-6)  %


 


Baso % (Auto)    (0-2)  %


 


Absolute Neuts (auto)    (1.5-7.7)  10^3/ul


 


Absolute Lymphs (auto)    (1.0-4.8)  10^3/ul


 


Absolute Monos (auto)    (0-0.8)  10^3/ul


 


Absolute Eos (auto)    (0-0.6)  10^3/ul


 


Absolute Basos (auto)    (0-0.2)  10^3/ul


 


Absolute Nucleated RBC    10^3/ul


 


Nucleated RBC %    


 


INR (Anticoag Therapy)    (0.89-1.11)  


 


APTT    (26.0-36.3)  seconds


 


Sodium    (133-145)  mmol/L


 


Potassium    (3.5-5.0)  mmol/L


 


Chloride    (101-111)  mmol/L


 


Carbon Dioxide    (22-32)  mmol/L


 


Anion Gap    (2-11)  mmol/L


 


BUN    (6-24)  mg/dL


 


Creatinine    (0.51-0.95)  mg/dL


 


Est GFR ( Amer)    (>60)  


 


Est GFR (Non-Af Amer)    (>60)  


 


BUN/Creatinine Ratio    (8-20)  


 


Glucose    ()  mg/dL


 


Lactic Acid  1.1   (0.5-2.0)  mmol/L


 


Calcium    (8.6-10.3)  mg/dL


 


Total Bilirubin    (0.2-1.0)  mg/dL


 


AST    (13-39)  U/L


 


ALT    (7-52)  U/L


 


Alkaline Phosphatase    ()  U/L


 


Troponin I    (<0.04)  ng/mL


 


B-Natriuretic Peptide   115 H ( - 100) pg/mL


 


Total Protein    (6.4-8.9)  g/dL


 


Albumin    (3.2-5.2)  g/dL


 


Globulin    (2-4)  g/dL


 


Albumin/Globulin Ratio    (1-3)  











Result Diagrams: 


 07/29/17 05:01





 07/29/17 05:01


Lab Statement: Any lab studies that have been ordered have been reviewed, and 

results considered in the medical decision making process.





- Radiology


  ** CXR


Radiology Interpretation Completed By: ED Physician - NAD





- CT


  ** CTA Chest


CT Interpretation Completed By: Radiologist - positive pulmonary emboli on the 

left. No infarct seen. Subcentimeter renal lesion on the left as above 

suspicious for a small renal cell carcinoma.





- EKG


  ** 0503


Cardiac Rate: NL - BPM 61


EKG Rhythm: Sinus Rhythm


EKG Interpretation: RBBB





Complex Multi-Symp Course/Dx


Assessment/Plan: 83 year old F BIBA to the ED with a CC of CP since 0230. The 

CC is described as heaviness. Patient took ASA and Tylenol PTA which did not 

relieve her symptoms. Pt reports upper back pain. CXR reveals NAD. CTA Chest 

reveals positive pulmonary emboli on the left. No infarct seen. Subcentimeter 

renal lesion on the left as above suspicious for a small renal cell carcinoma. 

We discussed patient care with Dr. Ponce (hospitalist) for admission. Patient 

will be admitted to Dr. Ponce. Pt is agreeable with this plan.





- Diagnoses


Provider Diagnoses: 


 Chest pain, Pulmonary emboli








- Physician Notifications


Discussed Care Of Patient With: Alexy Ponce


Time Discussed With Above Provider: 06:40


Instructed by Provider To: Other - Consulted Dr. Ponce (hospitalist) who 

agrees to admit.





Discharge





- Discharge Plan


Condition: Stable


Disposition: ADMITTED TO [facility]


Referrals: 


Abisai Salmon MD [Primary Care Provider] - 





The documentation as recorded by the Monica nava Alfonso accurately reflects 

the service I personally performed and the decisions made by me, Ricardo Rabago.

## 2017-07-29 NOTE — HP
CC:  Dr. Salmon; Dr. Williamson; Dr. Delcid

 

HISTORY AND PHYSICAL:

 

DATE OF ADMISSION:  17

 

PRIMARY CARE PROVIDER:  Dr. Salmon.

 

CARDIOLOGIST:  Dr. Williamson.

 

UROLOGIST:  Dr. Delcid.

 

CHIEF COMPLAINT:  Chest and back pain.

 

HISTORY OF PRESENT ILLNESS:  Ms. Burroughs is an 83-year-old female who is admitted from 17 [facility]
h 17 with atrial fibrillation and what was thought to be a viral illness.  The patient states 
that at the onset of that admission, she had awakened from sleep with rapid respiratory rate and fee
ling of fluttering in her chest.  She also had some chest heaviness.  When she presented to the [facility] room, she was found to be in rapid atrial fibrillation.  She converted on her own to normal si
nus rhythm.  She states over the next couple of days, she began to feel a little bit better and that
 she was not having as many body aches.  The patient states that yesterday afternoon, she was feelin
g well.  She states her appetite was improving.  She went to bed like usual.  She states that she wo
ke up on the morning of admission at approximately 2:30 a.m. with complaints of back ache.  She stat
es that she has had continued chest discomfort that comes and goes.  She denies any pain with deep b
reath.  She has noted a mild cough, but is not bringing up any sputum.  She states her fever is esse
ntially resolved.  The patient does admit to a recent trip to [place] earlier this month.

 

PAST MEDICAL HISTORY:

1.  Hypertension.

2.  History of PVCs.

3.  Osteopenia.

4.  History of Hashimoto's thyroiditis, status post left partial thyroidectomy.

 

PAST SURGICAL HISTORY:

1.  Left partial thyroidectomy.

2.  D and C.

3.  Uterine polyp removal.

4.  Bilateral cataract extractions.

 

MEDICATIONS:

1.  Aspirin 81 mg p.o. daily.

2.  Metoprolol tartrate 25 mg p.o. daily.

3.  Doxycycline 100 mg p.o. twice daily.

 

ALLERGIES:  No known drug allergies.

 

FAMILY HISTORY:  Mom  at age of 103, she had diabetes.  Dad  of heart disease at the age of 
87.

 

SOCIAL HISTORY:  The patient is a nonsmoker.  She does not drink alcohol.  She is a retired nurse.  
She is .  She has 4 children.  Her son, Graeme, home phone number is [phone] and cell phone [phone]
[phone], is her healthcare proxy.

 

REVIEW OF SYSTEMS:  The patient denies fevers.  She states her appetite has been poor though slowly 
improving.  She admits to the chest discomfort as above.  She denies any palpitations.  She notes th
at once she was in [place], her foot was swollen.  She states this is now resolved.  She admits to 
cough, but no sputum production.  No shortness of breath.  She has had some mild nausea.  No abdomin
al pain.  She does admit to being constipated.  No hematochezia.  No hematuria.  No dysuria.  No str
remedios-like symptoms.  No sudden changes in vision.  No dysphagia. She states her body aches had been i
mproving, but are slightly worse now than they were yesterday.  She does feel slightly dizzy.  She s
tates that she does have a rash that is fading that she notes on her legs and arms and back.  She de
nies any anxiety or depression.

 

                               PHYSICAL EXAMINATION

 

GENERAL:  The patient is a well-developed, elderly female, sitting up on the stretcher, in no acute 
distress.

 

VITAL SIGNS:  Blood pressure 141/68, pulse 74, respirations 18, temp 97.8, and O2 sat is 100% on suma
m air.

 

HEENT:  Pupils are equal, they are round.  There is evidence of prior cataract extractions.  Extraoc
ular muscles are intact.  Oropharynx is clear.  Oral mucosa is moist.  There is no submandibular, ce
rvical, or supraclavicular adenopathy.

 

PULMONARY:  Lungs are clear to auscultation bilaterally.

 

CARDIAC:  Normal S1 and S2.  Regular rate and rhythm.  I did not appreciate any murmurs.  There is r
ight greater than left lower extremity edema.

 

ABDOMEN:  Bowel sounds are present.  Abdomen is soft, nontender, nondistended.

 

MUSCULOSKELETAL:  There is no cyanosis or clubbing of the digits.  There is full active range of mot
ion of all 4 extremities.

 

NEURO:  Cranial nerves II through XII are grossly intact.  Sensation is intact to light touch throug
hout.  Strength is 5/5 and symmetric to both upper and lower extremities bilaterally.

 

PSYCH:  The patient is alert.  She is oriented x3.  Affect appears appropriate.

 

SKIN:  Warm.  It is dry.  There is a patchy erythematous rash on the patient's legs and faintly note
d on the patient's back.

 

 DIAGNOSTIC STUDIES/LAB DATA:  WBC 9.9, hemoglobin 11.0, hematocrit 33, platelets 324.  INR 0.99.  S
odium 131, potassium 3.8, chloride 99, CO2 26, BUN 23, creatinine 0.92, glucose 115, lactic acid 1.1
, calcium 9.6.  Bilirubin 0.5, AST 44, ALT 76, alk phos 135.  Troponin 0.02.  .  Albumin 3.0.

 

Chest x-ray:  No radiographic evidence for acute cardiopulmonary abnormality.

 

EKG:  Reveals normal sinus rhythm with inverted T waves in the anterior leads. Otherwise, no acute S
T-T wave abnormalities.

 

CTA chest, abdomen, and pelvis:  Nonocclusive pulmonary embolus involving the left lower lobar and s
egmental pulmonary arteries.  Along the anterior margin of the left kidney, there is a 1.1-cm enhanc
ing soft tissue nodule.  It is recommended to obtain further characterization on a nonemergent basis
 of either a contrast enhanced MRI or ultrasound examination of the left kidney.  Age-appropriate ch
ronic degenerative and iatrogenic changes are noted.

 

ASSESSMENT AND PLAN:  Ms. Burroughs is an 83-year-old female, who was recently admitted on 17 thro
[facility] 17 with rapid atrial fibrillation, who presents to the emergency room with complaints of b
ack and chest pain and was found to have pulmonary embolus in the left lung.

 

1.  Pulmonary embolism.  I am suspicious the patient may have in fact had a pulmonary embolism at th
e time of her prior hospitalization.  She does give history of a long distance travel to [facility] this month.  Additionally, she notes that her right leg/foot was swollen at the time of that t
rip.  It is possible the pulmonary embolism led to her atrial fibrillation that she presented with l
ast hospitalization as well as the mildly elevated troponins at that point.  Currently, the patient 
is completely hemodynamically stable.  She was started on heparin drip in the emergency room; bao caputo, this will be converted to Xarelto after the patient and I discussed options of anticoagulation in
cluding Coumadin versus Xarelto or Eliquis.  The patient will be monitored overnight on telemetry.  
At this point, I am going to hold off on ordering another echo as she did have an echocardiogram on 
17, which revealed global left ventricular wall motion and contractility to be within normal l
imits.  The ejection fraction was estimated to be 55% to 60%. The right ventricular global systolic 
function was normal.  No pulmonary hypertension was identified.  If the patient begins to develop he
modynamic compromise, I will go ahead and repeat echocardiogram.  Most likely, the patient will be a
ble to be discharged tomorrow morning.

2.  Hypertension.  The patient will be maintained on her usual dose of metoprolol.

3.  DVT prophylaxis.  According to the Adult Thrombosis Prophylaxis Risk Factor Assessment Guide, th
e patient has a total risk factor score of 7, making her high risk.  Xarelto will be utilized as her
 DVT prophylaxis.

4.  Code status is full.

 

TIME SPENT:  Sixty minutes was spent admitting this patient.

 

 

 

[id number]/[facility] #: [id number]

## 2017-07-29 NOTE — RAD
STUDY: CT angiography of the chest, abdomen and pelvis.



INDICATION: Chest and back pain that awoke the patient from sleep. Relevant surgical

history includes partial thyroidectomy.



COMPARISON: None.



TECHNIQUE: Multidetector CT angiography of the chest, abdomen and pelvis were obtained

from the lung apices to the ischial tuberosities after the intravenous injection of 100 mL

of Visipaque 320. Reformats were created in the coronal and sagittal planes. 3-D vascular

imaging was created from the source images and reviewed as well.



ANGIOGRAPHIC FINDINGS:



There is nonocclusive thrombus at the distal most portion of the left mainstem pulmonary

artery extending into the superior segmental artery. (Axial image 55 of 257). The

remaining pulmonary arteries appear to be free from thrombus.



There is mild calcification at the arch of the aorta. There is no pathologic aneurysmal

dilatation of the thoracic or abdominal aorta. Calcified atherosclerosis is seen at the

infrarenal abdominal aorta above the bifurcation extending into the iliac arteries.

In-line flow is recorded as far as the proximal femoral arteries bilaterally. The major

branch vessels from the abdominal aorta exhibits varying degrees of calcified

atherosclerosis at the origins but patency is maintained.



NON ANGIOGRAPHIC FINDINGS:



Chest: 



In keeping with the patient's reported surgical history the left lobe of the thyroid is

absent.



There is a small amount of abdominal plexuses along the anterior margin of the left lower

lobe adjacent to the heart. Otherwise the lungs are clear. There are no large pleural

effusions.



There is no mediastinal or hilar lymphadenopathy.



The heart is grossly normal in appearance.



Abdomen \T\ Pelvis: 



The liver, spleen, pancreas and adrenal glands are grossly normal in appearance. 



The gallbladder is normal.



Multiple fluid density cysts are seen bilaterally in the kidneys. Along the anterior

aspect of the mid-level left kidney (image 147) there is a solid enhancing soft tissue

structure extending from the cortex. The renal cortices otherwise enhance symmetrically

and promptly on the arterial phase imaging.



Evaluation of the gastrointestinal tract is limited in the absence of oral contrast. The

small and large bowel are not distended. Scattered rectosigmoid diverticula are noted.



There is no gross retroperitoneal or mesenteric lymphadenopathy.



The pelvic viscera is normal in appearance for the patient's age.



Multilevel degenerative changes of the thoracic and lumbar spine include loss of

intervertebral disc height.There are no sinister bone lesions.



IMPRESSION: 



1. Nonocclusive pulmonary embolus involving the left lower lobar and segmental pulmonary

arteries as described above.

2. Along the anterior margin of the left kidney there is a 1.1 cm enhancing soft tissue

nodule. I recommend further characterization on nonemergent basis with either

contrast-enhanced MRI or ultrasound examination of the left kidney.

3. Additional age-appropriate chronic, degenerative and iatrogenic changes as described in

the body the report.

## 2017-07-29 NOTE — RAD
INDICATION: Chest pain



COMPARISON: Most recent comparison chest x-rays dated July 23, 2017

 

TECHNIQUE: Single AP portable view of the chest was obtained.



FINDINGS: 



Image quality is compromised due to the relative inferiority of a portable chest x-ray.



There is mild cardiomegaly similar in appearance to the prior chest x-ray. The heart and

mediastinum otherwise exhibit normal contours.



The lungs are grossly clear. There is no evidence of a large pleural effusion.



Visualized bones are normal for the patient's age.



IMPRESSION:  No radiographic evidence for acute cardiopulmonary abnormality on this

portable chest x-ray.

## 2017-07-30 NOTE — PN
Objective


Active Medications: 








Acetaminophen (Tylenol Tab*)  650 mg PO Q4H PRN


   PRN Reason: FEVER/PAIN


   Last Admin: 07/30/17 08:39 Dose:  650 mg


Aspirin (Aspirin Low Dose Tab*)  81 mg PO BEDTIME [facility]


   Last Admin: 07/29/17 21:01 Dose:  81 mg


Doxycycline Hyclate (Vibramycin Cap(*))  100 mg PO BID [facility]


   Last Admin: 07/30/17 08:32 Dose:  100 mg


Metoprolol Tartrate (Lopressor Tab*)  25 mg PO DAILY [facility]


   Last Admin: 07/30/17 08:32 Dose:  25 mg


Rivaroxaban (Xarelto(*))  15 mg PO BID [facility]


   Last Admin: 07/30/17 08:32 Dose:  15 mg








 Vital Signs











  07/29/17 07/29/17 07/29/17





  14:00 15:00 15:15


 


Temperature   98.4 F


 


Pulse Rate   69


 


Respiratory 19 17 17





Rate   


 


Blood Pressure   127/63





(mmHg)   


 


O2 Sat by Pulse   99





Oximetry   














  07/29/17 07/29/17 07/29/17





  16:00 16:15 17:00


 


Temperature   


 


Pulse Rate   


 


Respiratory 14 13 44





Rate   


 


Blood Pressure   





(mmHg)   


 


O2 Sat by Pulse   





Oximetry   














  07/29/17 07/29/17 07/29/17





  18:00 19:00 19:33


 


Temperature   98.4 F


 


Pulse Rate   71


 


Respiratory 16 15 17





Rate   


 


Blood Pressure   151/74





(mmHg)   


 


O2 Sat by Pulse   100





Oximetry   














  07/29/17 07/29/17 07/29/17





  20:00 21:00 22:00


 


Temperature   


 


Pulse Rate   


 


Respiratory 14 17 15





Rate   


 


Blood Pressure   





(mmHg)   


 


O2 Sat by Pulse   





Oximetry   














  07/29/17 07/29/17 07/30/17





  23:00 23:29 00:00


 


Temperature  97.9 F 


 


Pulse Rate  68 


 


Respiratory 14 20 13





Rate   


 


Blood Pressure  145/61 





(mmHg)   


 


O2 Sat by Pulse  98 





Oximetry   














  07/30/17 07/30/17 07/30/17





  01:00 02:00 03:00


 


Temperature   


 


Pulse Rate   


 


Respiratory 14 19 2





Rate   


 


Blood Pressure   





(mmHg)   


 


O2 Sat by Pulse   





Oximetry   














  07/30/17 07/30/17 07/30/17





  03:45 04:00 05:00


 


Temperature 98.5 F  


 


Pulse Rate 68  


 


Respiratory 20 0 13





Rate   


 


Blood Pressure 139/60  





(mmHg)   


 


O2 Sat by Pulse   





Oximetry   














  07/30/17 07/30/17 07/30/17





  06:00 07:00 07:14


 


Temperature   97.7 F


 


Pulse Rate   63


 


Respiratory 12 18 16





Rate   


 


Blood Pressure   137/59





(mmHg)   


 


O2 Sat by Pulse   99





Oximetry   














  07/30/17 07/30/17 07/30/17





  08:00 09:00 10:00


 


Temperature   


 


Pulse Rate   


 


Respiratory 15 16 19





Rate   


 


Blood Pressure   





(mmHg)   


 


O2 Sat by Pulse   





Oximetry   














  07/30/17





  11:13


 


Temperature 98.4 F


 


Pulse Rate 57


 


Respiratory 16





Rate 


 


Blood Pressure 126/66





(mmHg) 


 


O2 Sat by Pulse 99





Oximetry 











Result Diagrams: 


 07/29/17 05:01





 07/29/17 05:01


Additional Lab and Data: 


 Lab Results











  07/29/17 07/29/17 07/29/17 Range/Units





  05:01 05:01 05:01 


 


WBC  9.9    (3.5-10.8)  10^3/ul


 


RBC  3.48 L    (4.0-5.4)  10^6/ul


 


Hgb  11.0 L    (12.0-16.0)  g/dl


 


Hct  33 L    (35-47)  %


 


MCV  95    (80-97)  fL


 


MCH  32 H    (27-31)  pg


 


MCHC  33    (31-36)  g/dl


 


RDW  14    (10.5-15)  %


 


Plt Count  324    (150-450)  10^3/ul


 


MPV  9    (7.4-10.4)  um3


 


Neut % (Auto)  56.4    (38-83)  %


 


Lymph % (Auto)  36.1    (25-47)  %


 


Mono % (Auto)  5.6    (1-9)  %


 


Eos % (Auto)  1.6    (0-6)  %


 


Baso % (Auto)  0.3    (0-2)  %


 


Absolute Neuts (auto)  5.6    (1.5-7.7)  10^3/ul


 


Absolute Lymphs (auto)  3.6    (1.0-4.8)  10^3/ul


 


Absolute Monos (auto)  0.6    (0-0.8)  10^3/ul


 


Absolute Eos (auto)  0.2    (0-0.6)  10^3/ul


 


Absolute Basos (auto)  0    (0-0.2)  10^3/ul


 


Absolute Nucleated RBC  0    10^3/ul


 


Nucleated RBC %  0    


 


INR (Anticoag Therapy)   0.99   (0.89-1.11)  


 


APTT   29.8   (26.0-36.3)  seconds


 


Sodium    131 L  (133-145)  mmol/L


 


Potassium    3.8  (3.5-5.0)  mmol/L


 


Chloride    99 L  (101-111)  mmol/L


 


Carbon Dioxide    26  (22-32)  mmol/L


 


Anion Gap    6  (2-11)  mmol/L


 


BUN    23  (6-24)  mg/dL


 


Creatinine    0.92  (0.51-0.95)  mg/dL


 


Est GFR ( Amer)    75.0  (>60)  


 


Est GFR (Non-Af Amer)    58.3  (>60)  


 


BUN/Creatinine Ratio    25.0 H  (8-20)  


 


Glucose    115 H  ()  mg/dL


 


Lactic Acid     (0.5-2.0)  mmol/L


 


Calcium    9.6  (8.6-10.3)  mg/dL


 


Total Bilirubin    0.50  (0.2-1.0)  mg/dL


 


AST    44 H  (13-39)  U/L


 


ALT    78 H  (7-52)  U/L


 


Alkaline Phosphatase    135 H  ()  U/L


 


Troponin I    0.02  (<0.04)  ng/mL


 


B-Natriuretic Peptide    ( - 100) pg/mL


 


Total Protein    6.0 L  (6.4-8.9)  g/dL


 


Albumin    3.0 L  (3.2-5.2)  g/dL


 


Globulin    3.0  (2-4)  g/dL


 


Albumin/Globulin Ratio    1.0  (1-3)  














  07/29/17 07/29/17 Range/Units





  05:01 05:01 


 


WBC    (3.5-10.8)  10^3/ul


 


RBC    (4.0-5.4)  10^6/ul


 


Hgb    (12.0-16.0)  g/dl


 


Hct    (35-47)  %


 


MCV    (80-97)  fL


 


MCH    (27-31)  pg


 


MCHC    (31-36)  g/dl


 


RDW    (10.5-15)  %


 


Plt Count    (150-450)  10^3/ul


 


MPV    (7.4-10.4)  um3


 


Neut % (Auto)    (38-83)  %


 


Lymph % (Auto)    (25-47)  %


 


Mono % (Auto)    (1-9)  %


 


Eos % (Auto)    (0-6)  %


 


Baso % (Auto)    (0-2)  %


 


Absolute Neuts (auto)    (1.5-7.7)  10^3/ul


 


Absolute Lymphs (auto)    (1.0-4.8)  10^3/ul


 


Absolute Monos (auto)    (0-0.8)  10^3/ul


 


Absolute Eos (auto)    (0-0.6)  10^3/ul


 


Absolute Basos (auto)    (0-0.2)  10^3/ul


 


Absolute Nucleated RBC    10^3/ul


 


Nucleated RBC %    


 


INR (Anticoag Therapy)    (0.89-1.11)  


 


APTT    (26.0-36.3)  seconds


 


Sodium    (133-145)  mmol/L


 


Potassium    (3.5-5.0)  mmol/L


 


Chloride    (101-111)  mmol/L


 


Carbon Dioxide    (22-32)  mmol/L


 


Anion Gap    (2-11)  mmol/L


 


BUN    (6-24)  mg/dL


 


Creatinine    (0.51-0.95)  mg/dL


 


Est GFR ( Amer)    (>60)  


 


Est GFR (Non-Af Amer)    (>60)  


 


BUN/Creatinine Ratio    (8-20)  


 


Glucose    ()  mg/dL


 


Lactic Acid  1.1   (0.5-2.0)  mmol/L


 


Calcium    (8.6-10.3)  mg/dL


 


Total Bilirubin    (0.2-1.0)  mg/dL


 


AST    (13-39)  U/L


 


ALT    (7-52)  U/L


 


Alkaline Phosphatase    ()  U/L


 


Troponin I    (<0.04)  ng/mL


 


B-Natriuretic Peptide   115 H ( - 100) pg/mL


 


Total Protein    (6.4-8.9)  g/dL


 


Albumin    (3.2-5.2)  g/dL


 


Globulin    (2-4)  g/dL


 


Albumin/Globulin Ratio    (1-3)  














Assess/Plan/Problems-Billing


Assessment: 











- Patient Problems


(1) Pulmonary embolism


Current Visit: Yes   Status: Acute   Code(s): I26.99 - OTHER PULMONARY EMBOLISM 

WITHOUT ACUTE COR PULMONALE   SNOMED Code(s): 57465390


   Comment: Will change to lovenox bridging to coumadin as both xarelto and 

elquis require prior authorizations. She will need INR on 8/1/17. We reviewed 

when/if she should come back to the hospital including worsened pain, SOB or 

hemoptysis. She was also found to have B/L below knee DVTs.

## 2017-07-31 NOTE — ED
Tala PUENTES Edward, scribed for Yudith Diaz MD on 07/31/17 at 0802 .





Neurological HPI





- HPI Summary


HPI Summary: 





84 y/o female BIBA c/o numbness and weakness in the L side of the face starting 

at 06:00 this morning. Pt woke up at 03:30 this morning. Pt states she is 

unable to close her L eye. No hearing deficiency, no weakness in upper 

extremities. PMHx AFIB, HTN, mild HLD. Pt lives alone. Earlier this month the 

pt had a HA and was dx with Lyme Disease, and has been on Doxy for 2 weeks. Pt 

was also recently admitted for PE and released yesterday. Past medications 

reviewed on visit. 





- History of Current Complaint


Stated Complaint: WEAKNESS, DIZZY


Hx Obtained From: Patient


Onset/Duration: Sudden Onset, Started hours ago - 06:00 this morning, Still 

Present


Timing: Constant


Neurological Deficit Location: Facial - L face


Character: Numbness/Tingling - L face, Other: - No hearing deficiency, no 

weakness in upper extremities





- Additional Pertinent History


Primary Care Physician: REID





- Allergy/Home Medications


Allergies/Adverse Reactions: 


 Allergies











Allergy/AdvReac Type Severity Reaction Status Date / Time


 


No Known Allergies Allergy   Verified 02/20/13 07:23














PMH/Surg Hx/FS Hx/Imm Hx


Previously Healthy: No


Endocrine/Hematology History: Reports: Hx Thyroid Disease - THYROIDECTOMY-  

HASHIMOTOS, Hx Anemia - SLIGHT IN THE PAST, Other Endocrine/Hematological 

Disorders


Cardiovascular History: Reports: Hx Atrial Fibrillation, Hx Deep Vein 

Thrombosis - 7/29/17, Hx Embolism - 7/29/17, Hx Hypercholesterolemia, Hx 

Hypertension - ON MEDS, Hx Valvular Heart Disease - AORTIC VALVE LEAKAGE, Other 

Cardiovascular Problems/Disorders - A fib/PE, Heart cath 07


Respiratory History: 


   Denies: Hx Asthma, Hx Chronic Obstructive Pulmonary Disease (COPD), Hx 

Pneumonia, Other Respiratory Problems/Disorders


GI History: 


   Denies: Hx Gastroesophageal Reflux Disease, Hx Gastrointestinal Bleed


 History: Reports: Other  Problems/Disorders - renal cysts


Musculoskeletal History: Reports: Hx Arthritis


   Denies: Hx Osteoporosis


Sensory History: Reports: Hx Cataracts, Hx Contacts or Glasses - GLASSES


   Denies: Hx Hearing Aid


Opthamlomology History: Reports: Hx Cataracts, Hx Contacts or Glasses - GLASSES


Neurological History: 


   Denies: Hx Migraine, Hx Seizures, Hx Transient Ischemic Attacks (TIA), Other 

Neuro Impairments/Disorders





- Cancer History


Hx Chemotherapy: No


Hx Radiation Therapy: No





- Surgical History


Surgery Procedure, Year, and Place: [place] [facility] [place]  

[place] [facility] [place].  [place], [place], [place].  1930S, 

TONSILECTOMY, [facility] AND ADENOIDECTOMY.  Heart cath 2007


Hx Anesthesia Reactions: No





- Family History


Known Family History: 


   Negative: Cardiac Disease - No MI





- Social History


Alcohol Use: None


Substance Use Type: Reports: None


Smoking Status (MU): Never Smoked Tobacco





Review of Systems


Constitutional: Negative


Eyes: Negative


ENT: Negative


Cardiovascular: Negative


Respiratory: Negative


Gastrointestinal: Negative


Genitourinary: Negative


Musculoskeletal: Negative


Skin: Negative


Neurological: Other - No weakness in upper extremities, no hearing deficiency


Positive: Numbness - L face


Psychological: Normal


All Other Systems Reviewed And Are Negative: Yes





Physical Exam


Triage Information Reviewed: Yes


Vital Signs On Initial Exam: 


 Initial Vitals











Temp Pulse Resp BP Pulse Ox


 


 97.8 F   72   20   148/71   98 


 


 07/31/17 07:55  07/31/17 07:55  07/31/17 07:55  07/31/17 07:55  07/31/17 07:55











Vital Signs Reviewed: Yes


Appearance: Positive: Well-Appearing, No Pain Distress


Skin: Positive: Warm, Skin Color Reflects Adequate Perfusion, Dry


Eyes: Positive: EOMI, JEREMY


ENT: Positive: Pharynx normal, TMs normal


Neck: Positive: Supple, Nontender


Respiratory/Lung Sounds: Positive: Clear to Auscultation, Breath Sounds 

Present.  Negative: Rales, Rhonchi, Wheezes


Cardiovascular: Positive: RRR, Other - No gallop.  Negative: Murmur, Rub


Abdomen Description: Positive: Nontender, Soft, Other: - No rebound.  Negative: 

Distended, Guarding


Bowel Sounds: Positive: Present


Musculoskeletal: Positive: Strength/ROM Intact.  Negative: Edema Left, Edema 

Right


Neurological: Positive: Sensory/Motor Intact, Alert, Oriented to Person Place, 

Time, CN Intact II-III


Psychiatric: Positive: Affect/Mood Appropriate





Diagnostics





- Vital Signs


 Vital Signs











  Temp Pulse Resp BP Pulse Ox


 


 07/31/17 08:09      98


 


 07/31/17 07:55  97.8 F  72  20  148/71  98














- Laboratory


Lab Results: 


 Lab Results











  07/31/17 07/31/17 07/31/17 Range/Units





  08:05 08:05 08:05 


 


WBC  8.9    (3.5-10.8)  10^3/ul


 


RBC  3.63 L    (4.0-5.4)  10^6/ul


 


Hgb  11.8 L    (12.0-16.0)  g/dl


 


Hct  35    (35-47)  %


 


MCV  96    (80-97)  fL


 


MCH  33 H    (27-31)  pg


 


MCHC  34    (31-36)  g/dl


 


RDW  14    (10.5-15)  %


 


Plt Count  376    (150-450)  10^3/ul


 


MPV  8    (7.4-10.4)  um3


 


Neut % (Auto)  43.1    (38-83)  %


 


Lymph % (Auto)  47.8 H    (25-47)  %


 


Mono % (Auto)  6.3    (1-9)  %


 


Eos % (Auto)  1.4    (0-6)  %


 


Baso % (Auto)  1.4    (0-2)  %


 


Absolute Neuts (auto)  3.9    (1.5-7.7)  10^3/ul


 


Absolute Lymphs (auto)  4.3    (1.0-4.8)  10^3/ul


 


Absolute Monos (auto)  0.6    (0-0.8)  10^3/ul


 


Absolute Eos (auto)  0.1    (0-0.6)  10^3/ul


 


Absolute Basos (auto)  0.1    (0-0.2)  10^3/ul


 


Absolute Nucleated RBC  0.01    10^3/ul


 


Nucleated RBC %  0.1    


 


INR (Anticoag Therapy)   1.24 H   (0.89-1.11)  


 


Sodium    132 L  (133-145)  mmol/L


 


Potassium    3.8  (3.5-5.0)  mmol/L


 


Chloride    100 L  (101-111)  mmol/L


 


Carbon Dioxide    27  (22-32)  mmol/L


 


Anion Gap    5  (2-11)  mmol/L


 


BUN    17  (6-24)  mg/dL


 


Creatinine    0.86  (0.51-0.95)  mg/dL


 


Est GFR ( Amer)    81.0  (>60)  


 


Est GFR (Non-Af Amer)    63.0  (>60)  


 


BUN/Creatinine Ratio    19.8  (8-20)  


 


Glucose    114 H  ()  mg/dL


 


Lactic Acid     (0.5-2.0)  mmol/L


 


Calcium    10.0  (8.6-10.3)  mg/dL


 


Total Bilirubin    0.50  (0.2-1.0)  mg/dL


 


AST    29  (13-39)  U/L


 


ALT    55 H  (7-52)  U/L


 


Alkaline Phosphatase    123 H  ()  U/L


 


Troponin I    0.01  (<0.04)  ng/mL


 


Total Protein    6.6  (6.4-8.9)  g/dL


 


Albumin    3.2  (3.2-5.2)  g/dL


 


Globulin    3.4  (2-4)  g/dL


 


Albumin/Globulin Ratio    0.9 L  (1-3)  


 


Urine Color     


 


Urine Appearance     


 


Urine pH     (5-9)  


 


Ur Specific Gravity     (1.010-1.030)  


 


Urine Protein     (Negative)  


 


Urine Ketones     (Negative)  


 


Urine Blood     (Negative)  


 


Urine Nitrate     (Negative)  


 


Urine Bilirubin     (Negative)  


 


Urine Urobilinogen     (Negative)  


 


Ur Leukocyte Esterase     (Negative)  


 


Urine Glucose     (Negative)  














  07/31/17 07/31/17 Range/Units





  08:05 09:55 


 


WBC    (3.5-10.8)  10^3/ul


 


RBC    (4.0-5.4)  10^6/ul


 


Hgb    (12.0-16.0)  g/dl


 


Hct    (35-47)  %


 


MCV    (80-97)  fL


 


MCH    (27-31)  pg


 


MCHC    (31-36)  g/dl


 


RDW    (10.5-15)  %


 


Plt Count    (150-450)  10^3/ul


 


MPV    (7.4-10.4)  um3


 


Neut % (Auto)    (38-83)  %


 


Lymph % (Auto)    (25-47)  %


 


Mono % (Auto)    (1-9)  %


 


Eos % (Auto)    (0-6)  %


 


Baso % (Auto)    (0-2)  %


 


Absolute Neuts (auto)    (1.5-7.7)  10^3/ul


 


Absolute Lymphs (auto)    (1.0-4.8)  10^3/ul


 


Absolute Monos (auto)    (0-0.8)  10^3/ul


 


Absolute Eos (auto)    (0-0.6)  10^3/ul


 


Absolute Basos (auto)    (0-0.2)  10^3/ul


 


Absolute Nucleated RBC    10^3/ul


 


Nucleated RBC %    


 


INR (Anticoag Therapy)    (0.89-1.11)  


 


Sodium    (133-145)  mmol/L


 


Potassium    (3.5-5.0)  mmol/L


 


Chloride    (101-111)  mmol/L


 


Carbon Dioxide    (22-32)  mmol/L


 


Anion Gap    (2-11)  mmol/L


 


BUN    (6-24)  mg/dL


 


Creatinine    (0.51-0.95)  mg/dL


 


Est GFR ( Amer)    (>60)  


 


Est GFR (Non-Af Amer)    (>60)  


 


BUN/Creatinine Ratio    (8-20)  


 


Glucose    ()  mg/dL


 


Lactic Acid  0.9   (0.5-2.0)  mmol/L


 


Calcium    (8.6-10.3)  mg/dL


 


Total Bilirubin    (0.2-1.0)  mg/dL


 


AST    (13-39)  U/L


 


ALT    (7-52)  U/L


 


Alkaline Phosphatase    ()  U/L


 


Troponin I    (<0.04)  ng/mL


 


Total Protein    (6.4-8.9)  g/dL


 


Albumin    (3.2-5.2)  g/dL


 


Globulin    (2-4)  g/dL


 


Albumin/Globulin Ratio    (1-3)  


 


Urine Color   Yellow  


 


Urine Appearance   Clear  


 


Urine pH   6.0  (5-9)  


 


Ur Specific Gravity   1.012  (1.010-1.030)  


 


Urine Protein   Negative  (Negative)  


 


Urine Ketones   Negative  (Negative)  


 


Urine Blood   Negative  (Negative)  


 


Urine Nitrate   Negative  (Negative)  


 


Urine Bilirubin   Negative  (Negative)  


 


Urine Urobilinogen   Negative  (Negative)  


 


Ur Leukocyte Esterase   Negative  (Negative)  


 


Urine Glucose   Negative  (Negative)  











Result Diagrams: 


 07/31/17 08:05





 07/31/17 08:05


Lab Statement: Any lab studies that have been ordered have been reviewed, and 

results considered in the medical decision making process.





- CT


  ** BRAIN CT


CT Interpretation: No Acute Changes - No intracranial mass or hemorrhage


CT Interpretation Completed By: Radiologist





- EKG


  ** 1


Cardiac Rate: NL


EKG Rhythm: Sinus Rhythm - @ 62 bpm


EKG Interpretation: 08:17 - T wave inversions, anterior leads


EKG Comparison: No Significant Change - From 07/29/17





- Additional Comments


Diagnostic Additional Comments: 





BRAIN MRI - NO ACUTE CHANGES - 1. No evidence for acute or subacute ischemia. 

Negative for suspicious intraosseous or


extra-axial lesions or mass effect. 2. Mild burden of nonspecific white matter 

hyperintensities on FLAIR/T2 series. While


nonspecific disease these lesions most likely represent foci of chronic small 

vessel


ischemic disease.





Interpreted by Radiologist.





NIH Scale





- NIH Scale


Level of Consciousness: Alert/Keenly Responsive


Ask Patient the Month and His/Her Age: Both Correct


Ask Pt to Open/Close Eyes and /Release Non-Paretic Hand: Both Correctly


Best Gaze (Only Horizontal Eye Movement): Normal


Visual Field Testing: No Visual Loss


Facial Paresis-Pt to Smile & Close Eyes or Grimace Symmetry: Minor Paralysis - 

L sided facial droop


Motor Function - Right Arm: No Drift-Holds 10 Seconds


Motor Function - Left Arm: No Drift-Holds 10 Seconds


Motor Function - Right Leg: No Drift-Holds 10 Seconds


Motor Function - Left Leg: No Drift-Holds 10 Seconds


Limb Ataxia-Must be out of Proportion to Weakness Present: Absent


Sensory (Use Pinprick to Test Arms/Legs/Trunk/Face): Normal


Best Language (Describe Picture, Name Items): No Aphasia


Extinction and Inattention: No Abnormality





Course/Dx





- Course


Assessment/Plan: Discussed patient care with Dr. Magaly Grey. Will wait on 

CT results to decide further assessment. Discussed CT results with Dr. Alaina Cortés, who suggests pt receive an MRI.  MRI neg for acute issues pt seen by 

Milana who advised pt to get artificial tears and asked that pt be prescribed 

acyclovir





- Diagnoses


Provider Diagnoses: 


 Bell's palsy








- Physician Notifications


Discussed Care Of Patient With: Alaina Cortés


Time Discussed With Above Provider: 13:00


Instructed by Provider To: MD Will See In ED





Discharge





- Discharge Plan


Condition: Stable


Disposition: HOME





The documentation as recorded by the Tala nava Edward accurately reflects the 

service I personally performed and the decisions made by me, Yudith Diaz MD.

## 2017-07-31 NOTE — RAD
Indication: Facial droop on the right.



CT of the brain was performed without IV contrast.



Ventricular structures are midline. No midline shift is noted. The extra-axial spaces are

unremarkable. There is no evidence of intracranial mass or hemorrhage. No other high or

low density lesions are identified.



Mastoid air cells and paranasal sinuses are otherwise unremarkable.



IMPRESSION: There is no intracranial mass or hemorrhage.

## 2017-07-31 NOTE — DS
CC:  Dr. Salmon *

 

DISCHARGE SUMMARY:

 

DATE OF ADMISSION:  07/29/17

 

DATE OF DISCHARGE:  07/30/17

 

PRIMARY CARE PROVIDER:  Dr. Salmon.

 

PRINCIPAL DIAGNOSES:

1.  Left-sided pulmonary embolism.

2.  Bilateral below knee deep venous thrombosis.

 

SECONDARY DIAGNOSES:

1.  Hypertension.

2.  Recent hospitalization where atrial fibrillation was identified.

3.  History of Hashimoto's thyroiditis.

 

DISCHARGE MEDICATIONS:

1.  Metoprolol tartrate 25 mg p.o. daily.

2.  Doxycycline 100 mg p.o. twice daily to complete the course of therapy 
started at home.

3.  Coumadin 5 mg p.o. daily.

4.  Lovenox 70 mg subcutaneous twice daily until INR is between 2 to 3.

 

Discontinued medications:

1.  Aspirin.

 

HOSPITAL COURSE:  Ms. Burroughs is an 83-year-old female who was recently admitted 
from 07/23/17 through 07/24/17 with what was felt to be a viral illness and 
atrial fibrillation.  At that time, the patient was ultimately discharged home 
to continue on her aspirin and metoprolol.  The patient represented to the 
emergency room on 07/29/17 with complaints of back pain as well as chest 
discomfort.  The patient underwent CTA of the chest which revealed a left-sided 
pulmonary embolism.  The patient was initially started on heparin drip, however
, after patient and I discussed her oral option, a decision was made to start 
her on Xarelto.  On the day of discharge, the patient is feeling well.  She 
continued to agree with treatment with Xarelto; however, when I checked with 
her pharmacy, it became clear that the patient will need a prior authorization 
for this.  The patient wished to be discharged to home from hospital, therefore
, she will be started on Lovenox 70 mg subcutaneously twice daily bridging to 
Coumadin.  The patient will be started on Coumadin 5 mg p.o. daily starting 
tonight.  The patient will need a followup INR on 08/01/17.  The patient has 
already an appointment scheduled with Dr. Coto for 08/02/17, and I have 
encouraged her to keep this appointment.  Of note, on admission, the patient's 
CTA also included the abdomen and pelvis.  The CT of the abdomen revealed a 1.1 
cm enhancing soft tissue nodule on the anterior margin of the left kidney.  It 
is recommended to have further characterization on an nonemergent basis with 
either a contrast enhanced MRI or an ultrasound examination of the left kidney 
to further evaluate this nodule.

 

On the day of discharge, the patient was awake, alert and oriented, sitting up 
in bed, appearing to be in no acute distress.  Her vital signs are stable with 
good blood pressure control and heart rate.  She is afebrile.  She is 99% on 
room air. The patient's cardiac exam revealed a normal S1 and S2 with a regular 
rate and rhythm.  Her lungs were clear to auscultation bilaterally.  The 
patient does have trace to 1+ bilateral lower extremity edema.  Again, the 
patient is stable for discharge to home today.

 

FOLLOWUP CONCERNS:  The patient is being discharged home today, 07/30/17.

 

ACTIVITY LEVEL:  As tolerated.

 

DIET:  Regular.

 

CONDITION ON DISCHARGE:  Stable.

 

TIME SPENT:  Thirty-five minutes was spent discharging this patient.

 

 [id number] #: [id number]

MTDD

## 2017-07-31 NOTE — RAD
Indication: LEFT facial droop.



Comparison: July 31, 2017 CT



Technique: LTG Exam Prep Platform Optima 1.5 Jayna [id number] with GEM suite.  MRI brain without contrast. 



Report: Diffusion series is negative for acute or subacute ischemia. Susceptibility series

is negative for stigmata of hemosiderin deposition to indicate previous hemorrhage.



Unremarkable cerebral sulci, ventricles, and basal cisterns for age. Normal variant mildly

prominent perivascular spaces. A few nonspecific periventricular and subcortical white

matter hyperintensities are noted at the bilateral cerebral hemispheres without mass

effect. No intra or extra-axial fluid collection evident.



Preserved major intracranial flow-voids.



Unremarkable orbital contents.



No suspicious calvarial or skull base lesions evident. Clear paranasal sinuses and mastoid

air spaces.



IMPRESSION: 

1. No evidence for acute or subacute ischemia. Negative for suspicious intraosseous or

extra-axial lesions or mass effect.

2. Mild burden of nonspecific white matter hyperintensities on FLAIR/T2 series. While

nonspecific disease these lesions most likely represent foci of chronic small vessel

ischemic disease.

## 2017-08-01 NOTE — CONS
CC:  Abisai Salmon MD, [facility] *

 

CONSULTATION REPORT:

 

DATE OF CONSULT:  17 - EMERGENCY DEPT

 

REQUESTING PHYSICIAN:  Dr. Yudith Diaz.

 

HISTORY OF PRESENT ILLNESS:  Yulia Burroughs is an 83-year-old woman with a 
history of hypertension, pulmonary embolism, atrial fibrillation, Hashimoto's 
thyroiditis status post partial thyroidectomy, osteopenia, D and C, uterine 
polyp removal, bilateral cataract repair who presents to hospital with left 
facial weakness.

 

Her history begins around 17 when she noticed a tick here in [facility] and removed it.  She then travelled to [place], [place] from 17 to 
17. She started noticing her right foot was swollen on that trip.  After 
she returned, she developed headaches behind and near her left ear in mid July.
  She occasionally had a slight dizzy, unsteady sensation.  She went to the 
emergency room on 17, 17 and was diagnosed with atrial fibrillation 
and possible virus. She after discharge developed multifocal rash and patches, 
the one on the back shaped a horseshoe and was started on doxycycline at 
[facility]. She was readmitted on 17 with findings of a 
pulmonary embolism in the left lower lobe and on her scan was also noted to 
have a 1.1 cm soft tissue nodule near her left kidney.

 

This morning, she awoke and she had weakness on the left side of her face 
noticing that her eye was not moving right and her mouth was not moving right 
and it was close to numb on the left hand side, she proceeded to come into the 
emergency room and is accompanied by her neighbor.

 

PAST MEDICAL/SURGICAL HISTORY:  Includes:

1.  Hypertension.

2.  PVC.

3.  Atrial fibrillation.

4.  Pulmonary embolism.

5.  A 1.1 cm soft tissue nodule near left kidney discovered in scan this past 
weekend and evaluation of pulmonary embolism.

6.  Osteopenia.

7.  Hashimoto's thyroiditis, status post partial thyroidectomy.

8.  D and C.

9.  Uterine polyp removal.

10.  Bilateral cataract repair.

 

CURRENT MEDICATIONS:  Include:

1.  Lovenox  70 mg subcu q.12 hours.

2.  Warfarin 5 mg p.o. q.h.s., which is yet to be started.

3.  Metoprolol 25 mg p.o. daily.

4.  Doxycycline 100 mg p.o. b.i.d.

5.  She had prior to recent admissions been on calcium and multivitamin, 
aspirin 81 mg and amlodipine, which had been stopped.  She tells me the 
amlodipine was stopped secondary to blood pressure getting low during admission.

 

ALLERGIES:  She has no known drug allergies.

 

FAMILY HISTORY:  Includes mother who  at 103 with diabetes.  She had 
peripheral vascular disease.  Father  at 85 of heart problems.  Brother 
with diabetes. Sister with chronic leukemia.  Four children among whom one has 
rheumatic heart disease.

 

SOCIAL HISTORY:  Ms. Burroughs lives by herself.  She is a .  She does not 
smoke or drink alcohol or use any illicit drugs.

 

REVIEW OF SYSTEMS:  There has been chronic cough, which has been around the 
period of time since she has been diagnosed with pulmonary embolism; however, 
she believes it started even earlier.  There were palpitations on the setting 
of atrial fibrillation.  She denies any chest pain.  There was a rash as noted 
above.  She has had no new bowel or bladder changes.  No mood changes.  There 
has been no known high fevers for unknown reason.  Please see HPI and past 
medical history for further positive review of systems.

 

PHYSICAL EXAMINATION:  On examination, most recent vitals include temperature 
97.8 degrees Fahrenheit, pulse 72, respiratory rate 20, saturation 98%, blood 
pressure 148/71.  She had a regular cardiac rhythm.  Lungs were clear to 
auscultation. There was no carotid bruit.  No rashes were noted.  She did have 
peripheral edema 1+ at her ankles with sock line noted and posterior tibialis 
and dorsalis pedis pulses were present.  She was awake, alert, articulate.  Had 
normal language function, adequate fund of knowledge.  Her pupils were equal 
and responsive to light.  Her fundi were flat.  She had full extraocular 
movements with no nystagmus. Full fields to confrontation.  Her facial 
expression was asymmetric with left facial weakness.  Facial sensation was 
symmetric.  Palate was upgoing.  Tongue was midline.  Sternocleidomastoid and 
trapezius were 5/5 in strength.  The left facial weakness included weakness of 
eye closure, albeit she was able to completely close the eye.  It is just that 
she had less ability to strongly close it.  She did have weakness also of 
smiling and slight weakness noted in the left forehead.  There was no pronator 
drift.  She gave good strength in upper and lower extremities with finger-to-
nose and heel-to-shin movements.  Vibration sensation was absent at the large 
toes, decreased at the ankles by 15 seconds.  There is no asymmetry to pinprick
, cold or light touch.  She had no evidence of dysmetria with normal finger- to-
nose and heel-to-shin movements.  Her reflexes were 2+ and symmetric with the 
exception that ankles are 1+, toes were flexor response bilaterally.  Her 
Romberg was negative.  She could walk on her heels and her toes.  Her gait was 
stable.  She had some difficulty with tandem gait, occasionally needed to step 
off to the side.

 

LABORATORY DATA:  Data includes reviewing previous medical records from our 
previous 2 hospitalizations in July and incorporated the pertinent data above.  
MRI of the brain was also performed today and this film was reviewed directly.  
There was no evidence of new stroke and she had some old small vessel ischemic 
disease. Please see report for details.

 

IMPRESSION AND PLAN:  Yulia Burroughs is an 83-year-old woman with history of 
ticks in early July followed by left-sided headaches, followed by rash and now 
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  
She has been started on doxycycline and this is the appropriate therapy and I 
would continue for full course of doxycycline.  Differential diagnosis does 
include a viral cause such as HSV.  Given she has presented within the day of 
symptoms, I would suggest a course of acyclovir 800 mg 4 times a day for 7 
days.  She does have normal renal function.

 

We talked about cure for a Bell's palsy using Artificial Tears to lubricate the 
eye and if it gets to the point that her eye does not close using Lacri-Lube, 
education was given on differential diagnosis.  At this point, we will not use 
steroids given lack of pain and given a high chance that this secondary to 
infection. We talked about the fact that she will need an ophthalmologist if 
she has any colored eye discharges, or reddening of the eye.  We talked about 
prognosis of  Bell's palsy and how this can take time to improve and the fact 
that some people are left with weakness.  As far as tying together her multiple 
diagnoses she has had in the last month, it is difficult to do so.  She did 
have an extended travel time, which may have made it more likely for her to 
have pulmonary embolism.  It is important for her to have close followup with 
her primary doctor regarding multiple diagnoses.  I have not set up a followup 
at this time, but I am more than willing to help if there are questions or 
concerns.

 

Her Lovenox dose is given later in the day today and Dr. Grey did stop by 
during this consultation to discuss timing of her Lovenox dose, starting of her 
Coumadin, and coumadin diet.

 

TIME SPENT:  Over an hour and 20 minutes were spent in direct face-to-face 
patient care.  All of the above were discussed.

 

 [id number]/[facility] #: [id number]

MTDD

## 2020-01-02 NOTE — HP
CC:  Dr. Abisai Salmon; Dr. Williamson *

 

PREOPERATIVE HISTORY AND PHYSICAL:

 

DATE OF ADMISSION/SURGERY:  20

 

This patient is scheduled for same-day surgery admission by Dr. Scarlet Koroma on .

 

DATE OF PREOPERATIVE HISTORY AND PHYSICAL EXAMINATION:  19

 

ATTENDING SURGEON:  Dr. Scarlet Koroma * (dictated by Italia Guillen NP).

 

CHIEF COMPLAINT:  Primary hyperparathyroidism.

 

HISTORY OF PRESENT ILLNESS:  The patient is an 86-year-old female with history 
of paroxysmal atrial fibrillation, pulmonary embolism, deep vein thrombosis, 
Bell's palsy, hypertension, and prior left thyroid lobectomy for benign thyroid 
nodule in the .  She presented to Dr. Koroma with hypercalcemia that was 
identified incidentally on routine labs approximately 6 months ago.  She said 
that her PCP, Dr. Salmon, identified this, and during workup, she was found to 
have primary hyperparathyroidism.  She was seen by Dr. Haynes, who referred her 
for a surgical evaluation.  She was found to have a peak calcium level of 10.6 
with an elevated PTH and normal vitamin D level.  The patient has a history of 
osteoporosis. She denies kidney stones.  She has some memory loss, but no other 
significant neurocognitive symptoms.  She has no history of thyroid cancer or 
endocrine disorders in her family.  Dr. Koroma performed an ultrasound here in the 
office and noted an enlarged right upper parathyroid gland.  Dr. Koroma reviewed 
the findings with the patient and has recommended flexible laryngoscopy and 
parathyroidectomy as a same-day surgery procedure.  She discussed the nature of 
the surgical procedure, the relevant risks and benefits, and today, I reviewed 
the typical postoperative care and recovery.  The patient has had a chance to 
ask questions and stated that she understands the information and is satisfied 
with the answers given to her questions.  She will sign surgical consent on the 
day of surgery.

 

PAST MEDICAL HISTORY:  Paroxysmal atrial fibrillation, pulmonary embolism and 
deep vein thrombosis, Bell's palsy, Lyme disease, essential hypertension, mild 
aortic regurgitation, and borderline aortic stenosis.

 

PAST SURGICAL HISTORY:  Left thyroid lobectomy in  for benign nodule, 
tonsillectomy with adenoidectomy, D and C with intrauterine polyp removal which 
was benign, bilateral cataract extraction.

 

MEDICATIONS:

1.  Xarelto 20 mg p.o. daily and she will take her last dose on 20 in 
preparation for surgery.

2.  Metoprolol 25 mg one-half tablet by mouth daily every morning.

3.  Alendronate 70 mg 1 tablet weekly on  and she will choose another 
day during the week of surgery.

4.  Refresh eye drops as needed for dry eyes.

 

ALLERGIES:  No known drug allergies.

 

FAMILY HISTORY:  No known anesthesia complications, bleeding tendencies, or 
clotting disorders.  Father  with a history of stroke and myocardial 
infarction at age 85.  Mother lived to age 103 with a history of diabetes, 
peripheral vascular disease, and ulcers.

 

SOCIAL HISTORY:  She is  and lives alone.  She will arrange for someone 
to stay with her overnight postoperatively.  She has never been a smoker and 
denies the use of alcohol or other substances.

 

REVIEW OF SYSTEMS:  General: No previous anesthesia complications. No bleeding 
tendencies or blood transfusions.  She does have a history of pulmonary 
embolism and deep vein thrombosis diagnosed approximately 2 years ago and 
continues on anticoagulation.  Constitutional:  No fevers, or chills, excessive 
fatigue or weight loss.  Endocrine:  As described in history of present illness
, no diabetes. Respiratory:  No dyspnea on exertion, no chronic cough.  
Cardiovascular:  No anginal chest pain or palpitations or syncopal episodes.  
No history of previous myocardial infarction.  She is followed by Dr. Williamson 
and has an appointment with him on 01/10/20 for preoperative evaluation and we 
will obtain that note. Gastrointestinal:  No nausea, vomiting, diarrhea, GI 
bleeding, or constipation or change in bowel habits.  Genitourinary:  No 
dysuria.  Musculoskeletal:  Normal strength and tone.  Neurologic:  Facial 
drooping and eyelid drooping on the left side status post Bell's palsy.  No 
blurred vision.  No headache.  Psychiatric:  No reported anxiety, depression, 
or insomnia.

 

                               PHYSICAL EXAMINATION

 

GENERAL SURVEY:  The patient is an 86-year-old female, well developed, well 
nourished, in no acute distress.

 

VITAL SIGNS:  Height 63 inches, weight 130 pounds, body mass index 23.  Blood 
pressure 142/72, pulse 68 and regular, respiratory rate 16, temperature 97.2 
tympanic.

 

HEENT:  Benign.  Mild facial and left eyelid drooping status post Bell palsy.

 

NECK:  Supple.  No palpable masses appreciated.  No carotid bruits.  No 
cervical lymphadenopathy.

 

LUNGS:  Breath sounds bilaterally clear and equal.

 

HEART:  Regular rate and rhythm.  No murmurs or rubs appreciated.

 

ABDOMEN:  Active bowel sounds.  Soft, nondistended, nontender throughout.  No 
obvious masses, organomegaly, or evidence of ventral hernia.

 

PELVIC:  Exam deferred.

 

RECTAL:  Exam deferred.

 

EXTREMITIES:  No edema or skin ulcerations or cyanosis.

 

NEUROLOGIC:  Alert and oriented x3.  Steady gait.

 

SKIN:  Warm, dry, intact.

 

 IMPRESSION:  Primary hyperparathyroidism.

 

PLAN:  Same-day surgery admission to Dr. Scarlet Koroma's service on 20 for 
flexible laryngoscopy and parathyroidectomy.  The patient will have 
preoperative evaluation with Dr. Williamson on 01/10/20 and we will obtain that 
report.  She will take her last dose of Xarelto on 20 in preparation for 
surgery.

 

 ____________________________________ MICHAEL GUILLEN, NP

 

[id number]/[facility] #: [id number]

MTDD

## 2020-01-16 ENCOUNTER — HOSPITAL ENCOUNTER (OUTPATIENT)
Dept: HOSPITAL 25 - OR | Age: 85
Setting detail: OBSERVATION
LOS: 1 days | Discharge: HOME | End: 2020-01-17
Attending: SURGERY | Admitting: SURGERY
Payer: MEDICARE

## 2020-01-16 DIAGNOSIS — Z86.718: ICD-10-CM

## 2020-01-16 DIAGNOSIS — I26.99: ICD-10-CM

## 2020-01-16 DIAGNOSIS — Z79.899: ICD-10-CM

## 2020-01-16 DIAGNOSIS — Z79.01: ICD-10-CM

## 2020-01-16 DIAGNOSIS — I48.0: ICD-10-CM

## 2020-01-16 DIAGNOSIS — I10: ICD-10-CM

## 2020-01-16 DIAGNOSIS — E21.0: Primary | ICD-10-CM

## 2020-01-16 DIAGNOSIS — R11.10: ICD-10-CM

## 2020-01-16 DIAGNOSIS — G58.9: ICD-10-CM

## 2020-01-16 DIAGNOSIS — Z86.711: ICD-10-CM

## 2020-01-16 LAB
ANION GAP SERPL CALC-SCNC: 6 MMOL/L (ref 2–11)
BASOPHILS # BLD AUTO: 0 10^3/UL (ref 0–0.2)
BUN SERPL-MCNC: 29 MG/DL (ref 6–24)
BUN/CREAT SERPL: 33.3 (ref 8–20)
CALCIUM SERPL-MCNC: 8.9 MG/DL (ref 8.6–10.3)
CHLORIDE SERPL-SCNC: 108 MMOL/L (ref 101–111)
EOSINOPHIL # BLD AUTO: 0 10^3/UL (ref 0–0.6)
GLUCOSE SERPL-MCNC: 135 MG/DL (ref 70–100)
HCO3 SERPL-SCNC: 24 MMOL/L (ref 22–32)
HCT VFR BLD AUTO: 39 % (ref 35–47)
HGB BLD-MCNC: 12.6 G/DL (ref 12–16)
LYMPHOCYTES # BLD AUTO: 4.2 10^3/UL (ref 1–4.8)
MCH RBC QN AUTO: 32 PG (ref 27–31)
MCHC RBC AUTO-ENTMCNC: 33 G/DL (ref 31–36)
MCV RBC AUTO: 100 FL (ref 80–97)
MONOCYTES # BLD AUTO: 0.1 10^3/UL (ref 0–0.8)
NEUTROPHILS # BLD AUTO: 5.1 10^3/UL (ref 1.5–7.7)
NRBC # BLD AUTO: 0 10^3/UL
NRBC BLD QL AUTO: 0.2
PLATELET # BLD AUTO: 168 10^3/UL (ref 150–450)
POTASSIUM SERPL-SCNC: 4.5 MMOL/L (ref 3.5–5)
RBC # BLD AUTO: 3.91 10^6 /UL (ref 3.7–4.87)
SODIUM SERPL-SCNC: 138 MMOL/L (ref 135–145)
WBC # BLD AUTO: 9.5 10^3/UL (ref 3.5–10.8)

## 2020-01-16 PROCEDURE — 96372 THER/PROPH/DIAG INJ SC/IM: CPT

## 2020-01-16 PROCEDURE — 80048 BASIC METABOLIC PNL TOTAL CA: CPT

## 2020-01-16 PROCEDURE — 83970 ASSAY OF PARATHORMONE: CPT

## 2020-01-16 PROCEDURE — 88305 TISSUE EXAM BY PATHOLOGIST: CPT

## 2020-01-16 PROCEDURE — G0378 HOSPITAL OBSERVATION PER HR: HCPCS

## 2020-01-16 PROCEDURE — 88331 PATH CONSLTJ SURG 1 BLK 1SPC: CPT

## 2020-01-16 PROCEDURE — 82310 ASSAY OF CALCIUM: CPT

## 2020-01-16 PROCEDURE — 96374 THER/PROPH/DIAG INJ IV PUSH: CPT

## 2020-01-16 PROCEDURE — 85025 COMPLETE CBC W/AUTO DIFF WBC: CPT

## 2020-01-16 PROCEDURE — 36415 COLL VENOUS BLD VENIPUNCTURE: CPT

## 2020-01-16 RX ADMIN — SODIUM CHLORIDE, SODIUM LACTATE, POTASSIUM CHLORIDE, AND CALCIUM CHLORIDE SCH MLS/HR: 600; 310; 30; 20 INJECTION, SOLUTION INTRAVENOUS at 16:12

## 2020-01-16 RX ADMIN — Medication SCH: at 16:04

## 2020-01-16 RX ADMIN — HEPARIN SODIUM SCH UNITS: 5000 INJECTION INTRAVENOUS; SUBCUTANEOUS at 21:46

## 2020-01-16 RX ADMIN — Medication SCH: at 16:14

## 2020-01-16 NOTE — CONS
MEDICAL CONSULTATION REPORT:

 

DATE OF CONSULT:  20

 

REQUESTING PHYSICIAN:  Dr. Koroma.

 

REASON FOR CONSULTATION:  Medical management in the post-parathyroidectomy 
phase.

 

HISTORY OF PRESENT ILLNESS:  An 86-year-old female with a history of atrial 
fibrillation, pulmonary embolus, DVT, hypertension, prior left thyroid lobectomy
, who was seen by Dr. Koroma as an outpatient for hypercalcemia, was noted to have 
a workup and was noted to have primary hyperparathyroidism, with a history of 
osteoporosis, and the patient is status post parathyroidectomy today.

 

At the time of exam, the patient reports that she is doing well except for 
significant nausea, and the patient vomited during the interview.  The patient 
has p.r.n. Zofran ordered.  Other than this, the patient denies any numbness, 
tingling, or spasms; no tetany symptoms, no abdominal pain, and otherwise doing 
well.  The patient received general anesthesia and regional anesthesia for her 
procedure.

 

PAST MEDICAL HISTORY:

1.  Abducens nerve palsy.

2.  Bell palsy.

3.  Benign essential hypertension.

4.  Aortic valve disorder.

5.  Mitral valve disorder.

6.  Heart murmur.

7.  Hashimoto's thyroiditis.

8.  Hypercholesterolemia, colonoscopy.

9.  Atrial fibrillation.

10.  PE and DVT as mentioned.

 

PAST SURGICAL HISTORY:

1.  Left thyroid lobectomy in  for cold nodule.

2.  Tonsillectomy with adenoidectomy.

3.  D and C.

4.  Intrauterine polyp removal, benign.

5.  Cataract removal.

 

HOME MEDICATIONS:

1.  Metoprolol 25 mg half tablet by mouth daily.

2.  Alendronate 70 mg 1 tab p.o. once a day.

3.  Xarelto 20 mg 1 by mouth every day.

4.  Refresh use daily for dry eyes.

5.  Nitroglycerin 0.5 mg sublingual as needed for chest pain.

 

FAMILY HISTORY:  Father  from MI at age 85, stroke in his 70s, 
hypertension.  Mother  from ulcer at age 103 years with diabetes, 
peripheral vascular disease.  Siblings:  Two brothers with diabetes, sister 
with leukemia.  Two sons and two daughters; one son with diabetes and one son 
with prediabetes.

 

SOCIAL HISTORY:  The patient is retired, never smoked, does not use alcohol or 
other recreational drugs.

 

REVIEW OF SYSTEMS:  As mentioned in the HPI, other 14-point review of systems 
noted to be negative.

 

ASSESSMENT AND PLAN:

1.  Primary hyperparathyroidism, status post right upper parathyroidectomy.  
The patient was noted to have very enlarged right upper parathyroid adenoma.

2.  Her PTH prior to her procedure was noted to be 299.4 and from earlier this 
morning was noted to be 115.4.  The patient's calcium in the past was 10.6 with 
ionized calcium of 1.4.

3.  We will follow calcium closely in the postop setting to monitor and rule 
out Hungry bone syndrome.  The patient currently does not have any symptoms 
suggestive of hypocalcemia and is doing well.  We will monitor closely in the 
next 24 to 48 hours.

4.  The patient's nausea is likely related to anesthesia in surgery.  We will 
treat with p.r.n. Zofran and will monitor closely.

5.  We will check a BMP later in the day tonight if warranted with repeat labs 
in the morning.

6.  Atrial fibrillation.  The patient has restarted on her metoprolol and 
currently heart rate is well controlled.  Can restart Xarelto, when okayed from 
the surgical standpoint.  The patient is currently not short of breath and 
doing well.

7.  DVT prophylaxis with heparin.

8.  The patient also received calcium carbonate post her procedure.

9.  Pain control.  Percocet is ordered by Surgery.

10.  We will closely follow PTH and calcium levels.

11.  We will follow with the surgical team and be available for any questions.

 

 [id number]/[facility] #: [id number]

JUAN RAMON

## 2020-01-16 NOTE — BRIEFOPN
Brief Operative/Procedure Note





- Operation Details


Pre-Op Diagnosis: primary hyperparathyroidism


Post-Op Diagnosis: primary hyperparathyroidism


Procedures: RU parathyroidectomy


Surgeon(s)/Proceduralists: Scarlet Koroma


Anesthesia: GETA


Findings: very enlarged right upper parathyroid adenoma


Complications: none

## 2020-01-17 VITALS — DIASTOLIC BLOOD PRESSURE: 47 MMHG | SYSTOLIC BLOOD PRESSURE: 100 MMHG

## 2020-01-17 RX ADMIN — HEPARIN SODIUM SCH UNITS: 5000 INJECTION INTRAVENOUS; SUBCUTANEOUS at 05:38

## 2020-01-17 RX ADMIN — SODIUM CHLORIDE, SODIUM LACTATE, POTASSIUM CHLORIDE, AND CALCIUM CHLORIDE SCH MLS/HR: 600; 310; 30; 20 INJECTION, SOLUTION INTRAVENOUS at 03:38

## 2020-01-17 RX ADMIN — Medication SCH ML: at 05:40

## 2020-01-17 NOTE — OP
CC:  Dr. Salmon; Dr. Morgan Coch *

 

DATE OF OPERATION:  01/16/20 - ROOM #[id number]

 

DATE OF BIRTH:  [birth year]

 

SERVICE:  General Surgery.

 

ATTENDING SURGEON:  Scarlet Koroma MD

 

ASSISTANT:  Italia Gutiérrez NP

 

ANESTHESIOLOGIST:  Dr. Reddy.

 

ANESTHESIA:  General endotracheal anesthesia.

 

PRE-OP DIAGNOSIS:  Primary hyperparathyroidism.

 

POST-OP DIAGNOSIS:  Primary hyperparathyroidism.

 

OPERATIVE PROCEDURE:  Flexible laryngoscopy and right upper parathyroidectomy.

 

SPECIMEN:  Right upper parathyroid glands.

 

ESTIMATED BLOOD LOSS:  Minimal, less than 10 cc.

 

INDICATIONS FOR SURGERY:  Ms. Burroughs is a very pleasant 86-year-old female with 
a history of osteoporosis, prior left lobectomy, atrial fibrillation, and 
hypertension, who also had primary hyperparathyroidism.  She has had elevated 
calcium up to 10.9 and preoperative office ultrasound showed what looks to be a 
right-sided parathyroid adenoma. Given that she has had prior neck surgery, I 
did discuss with the patient that it would be unclear how if on the left side 
she had any parathyroid glands remaining given that she did have a left 
lobectomy in the past over 30 years ago and there is no operative report from 
this procedure.  Since she has also had prior neck surgery, she did give 
informed consent for flexible laryngoscopy to evaluate her vocal cords.  She 
understood that the plan would be to perform a focused right upper 
parathyroidectomy.  She understood the alternatives and benefits as well and 
she wished to proceed with surgery.

 

DESCRIPTION OF PROCEDURE:  The patient was brought back to the operating room 
and placed on the operating table in the supine position.  Sequential 
compression devices were placed in the bilateral lower extremities for DVT 
prophylaxis.  No antibiotics were administered.  The patient was given small 
amount of propofol to assist with sedation and benzocaine spray was used to 
anesthetize her nose and the back of her throat.  Next, a flexible laryngoscope 
was then placed to her right naris and advanced to her vocal cords.  She was 
little bit too sleepy to phonate for us, however, with breathing, there was 
clearly midline apposition at her vocal cords.  The laryngoscope was then 
removed.  The patient underwent general endotracheal anesthesia and then 
electrodes of the nerve monitor were attached.  A time-out was performed prior 
to administering local anesthesia to the neck, which consisted of 0.25% 
Marcaine and 1% lidocaine mixed.  After this was done, her neck was prepped and 
draped in the normal sterile fashion.  The patient had a prior incision from 
her left thyroid lobectomy many years ago. This incision was used today. 
Approximately 4-cm incision was made through her old incision, which was about 
3 fingerbreadths above the sternal notch.  The skin was divided down to the 
subcutaneous tissue.  The platysma was divided and the inferior and superior 
subplatysmal flaps were developed.  The median raphe between the strap muscles 
was easily identified and the strap muscles were retracted laterally off of the 
isthmus of the thyroid.  Next, given that she had an ultrasound showing right 
upper adenoma, attention was turned towards the right side.  The right thyroid 
lobe was retracted anteriorly and laterally. The middle thyroid vein was 
identified and divided and an exploration of the posterior tracheoesophageal 
groove was performed. The patient had a thyroid tubercle that was very 
prominent.  It was most likely thought to be thyroid gland, however, small 
piece of the tissue was taken for biopsy that confirmed thyroid tissue.  The 
space posterior to this and near the esophagus was also explored superiorly and 
no upper adenomas were identified. Next, attention was turned towards 
identifying the recurrent laryngeal nerve.  It was identified with the 
assistance of the nerve monitor and also visually identified.  _______ was 
coursed out and once this was done, the space posterior to this was examined 
more carefully.  A very large parathyroid adenoma was then identified posterior 
to the nerve little bit more inferiorly.  It was extremely enlarged and it was 
dissected out in its entirety.  Once the pedicle was isolated, it was then 
divided with care to avoid any injury to the recurrent laryngeal nerve. At this 
point, serial PTH values were obtained.  Her baseline had been obtained in the 
preoperative holding area and it was 282.8.  T0 was 299.4, T5 was 182.1, and 
T10 was 115.4.  Given the Berwick criteria had been met, the surgery was 
terminated. Hemostasis was obtained in the right lateral neck.  Tisseel was 
placed into the right lateral neck and then the strap muscles were 
reapproximated using interrupted 4-0 Vicryl sutures.  The platysma was 
reapproximated using 4-0 Vicryl sutures.  The skin was closed using running 5-0 
Prolene suture.  Sterile dressing was then placed.  The patient's anesthesia 
was reversed and she was taken to the PACU in stable condition.  At the end of 
the case, all counts were correct and I was present during the entirety of the 
case.

 

 [id number]/[facility] #: [id number]

MTDD

## 2020-01-17 NOTE — PN
Subjective


Date of Service: 01/17/20


Interval History: 





Reports feeling well.Plan for discharge today





Objective


Active Medications: 








Acetaminophen (Tylenol Tab*)  650 mg PO Q4H PRN


   PRN Reason: MILD PAIN or TEMP > 100.4


Docusate Sodium (Colace Cap*)  100 mg PO BID PRN


   PRN Reason: CONSTIPATION


Heparin Sodium (Porcine) (Heparin Flush Picc/Ml/Cvc(*))  1 - 3 ml FLUSH 0600,

1800 [facility]; Protocol


   Last Admin: 01/17/20 05:40 Dose:  1 ml


Hydromorphone HCl (Dilaudid Inj1s*)  0.5 mg IV SLOW PU Q2H PRN


   PRN Reason: PAIN - SEVERE


Lactated Ringer's (Lactated Ringers 1000 Ml Bag*)  1,000 mls @ 75 mls/hr IV PER 

RATE [facility]


   Last Admin: 01/17/20 03:38 Dose:  75 mls/hr


Ibuprofen (Motrin Tab*)  600 mg PO Q8H PRN


   PRN Reason: PAIN - MILD


Metoprolol Tartrate (Lopressor Tab*)  25 mg PO QAM [facility]


   Last Admin: 01/17/20 08:40 Dose:  25 mg


Nitroglycerin (Nitroglycerin Tab 0.4 Mg*)  0.4 mg SL Q5M PRN


   PRN Reason: chest pain


Ondansetron HCl (Zofran Inj*)  4 mg IV Q4H PRN


   PRN Reason: NAUSEA/VOMITING


   Last Admin: 01/16/20 17:15 Dose:  4 mg


Oxycodone/Acetaminophen (Percocet 5/325 Tab*)  1 tab PO Q4H PRN


   PRN Reason: PAIN - MODERATE


Polyethyl Glycol/Propylene Glycol (Lubricant Eye Drops)  1 drop LEFT EYE QID PRN


   PRN Reason: DRY EYE








 Vital Signs - 8 hr











  01/17/20 01/17/20 01/17/20





  08:00 08:44 11:38


 


Temperature 98 F  98 F


 


Pulse Rate 67  57


 


Respiratory 17 16 15





Rate   


 


Blood Pressure 108/46  100/47





(mmHg)   


 


O2 Sat by Pulse 97  99





Oximetry   











Oxygen Devices in Use Now: None


Neck: NL Appearance and Movements; NL JVP


Respiratory: Symmetrical Chest Expansion and Respiratory Effort


Cardiovascular: - - irregularly irregular


Abdominal: NL Sounds; No Tenderness; No Distention


Extremities: No Edema


Neurological: Alert and Oriented x 3


Result Diagrams: 


 01/16/20 17:53





 01/16/20 17:53





Assess/Plan/Problems-Billing


Assessment: 











- Patient Problems


(1) HTN (hypertension)


Current Visit: No   Status: Acute   Code(s): I10 - ESSENTIAL (PRIMARY) 

HYPERTENSION   SNOMED Code(s): 10456019


   Comment: Continue home meds   





(2) PAF (paroxysmal atrial fibrillation)


Current Visit: No   Status: Acute   Code(s): I48.0 - PAROXYSMAL ATRIAL 

FIBRILLATION   SNOMED Code(s): 567373425


   Comment: Continue anticoag from tomorrow and Metoprolol   





(3) Pulmonary embolism


Current Visit: No   Status: Acute   Code(s): I26.99 - OTHER PULMONARY EMBOLISM 

WITHOUT ACUTE COR PULMONALE   SNOMED Code(s): 54330104


   Comment: h/o PE


On Xarelto   





(4) Hyperparathyroidism


Current Visit: Yes   Status: Acute   Code(s): E21.3 - HYPERPARATHYROIDISM, 

UNSPECIFIED   SNOMED Code(s): 85252497


   Comment: Pth and Ca levels appropriate post surgery


s/p parathyroidectomy yesterday


Doing well postop

## 2020-01-17 NOTE — PN
Progress Note





- Progress Note


Date of Service: 01/17/20


Note: 





S: POD #1. Had one episode of vomiting last pm; none this a.m. Nieves'd bfast. 

Notes some hoarseness this a.m. Some mild pain w/ swallowing; has not req'd any 

analgesics.





O: 


 Vital Signs - 8 hr











  01/17/20 01/17/20 01/17/20





  03:26 08:00 08:44


 


Temperature 98.3 F 98 F 


 


Pulse Rate 63 67 


 


Respiratory 16 17 16





Rate   


 


Blood Pressure 109/46 108/46 





(mmHg)   


 


O2 Sat by Pulse 96 97 





Oximetry   








Intake and Output Last 24 Hours











 01/15/20 01/16/20 01/17/20 01/18/20





 06:59 06:59 06:59 06:59


 


Intake Total   2068 320


 


Output Total   1000 400


 


Balance   1068 -80


 


Weight   132 lb 


 


Intake:    


 


  IV Fluids   1358 


 


    LR   1358 


 


  Oral   710 320


 


Output:    


 


  Urine   1000 400


 


Other:    


 


  Date of Last Bowel   1/16/20 





  Movement    


 


  # Bowel Movements   1 


 


  Estimated Stool Amount   Small 








Gen: appears well; NAD


Neck: dressing in place; clean, dry. No sig swelling or ecchymosis. Soft; no 

sig tenderness.





Labs: 


 Laboratory Tests











  01/17/20 01/17/20





  05:33 05:33


 


Calcium  8.9 


 


PTH Intact   14.6








A: s/p parathyroidectomy, doing well





P: instructions reviewed; home today; restart Xarelto 1/18. Office f/u next wk.

## 2020-01-17 NOTE — DS
CC:  Dr. Abisai Salmon; Dr. Eddie Haynes *

 

DISCHARGE SUMMARY:

 

DATE OF ADMISSION:  01/16/20

 

DATE OF DISCHARGE:  01/17/20

 

ATTENDING SURGEON:  Dr. Scarlet Koroma.* (DICTATED BY ANTON GARCIA)

 

HOSPITAL COURSE:  Please refer to admission history and physical and operative 
note as well as daily progress note from the morning of discharge.  The patient 
underwent parathyroidectomy on 01/16/20 with Dr. Koroma.  She was admitted 
overnight for observation.  She was seen in consultation by the hospitalist for 
medical management.  She did have 1 episode of vomiting in the evening of 
surgery, but this has since passed and she is tolerating diet well the morning 
of discharge.  She does report some mild hoarseness and mild pain with 
swallowing.  Exam was unremarkable and specifically neck exam revealed intact, 
clean and dry dressing without significant swelling, hematoma, or ecchymosis.  
Laboratory on the morning of discharge revealed abnormal serum calcium and PTH.

 

IMPRESSION:  Status post parathyroidectomy.

 

PLAN:  Home today.  Instructions were reviewed.  She will resume her Xarelto as 
of 01/18/20.  Her other medications will be resumed per her usual regimen.  She 
has a followup scheduled with Dr. Koroma on 01/22/20.  She is discharged to home 
in good condition.

 

____________________________________ ANTON GARCIA

 

[id number]/[facility] #: [id number]

MTDD

## 2024-06-11 NOTE — HP
CC:  Dr. Salmon; Dr. Williamson, Cardiology. *

 

HISTORY AND PHYSICAL:

 

DATE OF ADMISSION:  07/23/17

 

PRIMARY CARE PHYSICIAN:  Dr. Salmon.

 

CHIEF COMPLAINT:  Palpitations, chest heaviness.

 

HISTORY OF PRESENT ILLNESS:  Ms. Burroughs is a pleasant 83-year-old female with a 
past medical history of hypertension, "irregular heart rate;" osteopenia; 
Hashimoto's thyroiditis, status post left partial thyroidectomy who presents to 
the hospital with headache, palpitations and chest heaviness.  Patient states 
that these symptoms began this morning; however, she has been having flu-like 
symptoms for the past week or so.  Earlier this month around 07/03/17 or 07/04/
17, she found a tick under her right breast.  She thinks it was on there for at 
least 48 hours possibly up to 3 plus days.  The tick was removed.  She went on 
vacation and came back on 07/11/17.  About a week after that her symptoms 
began.  She states she has flu-like symptoms, general malaise, low grade fevers 
and chills at home with a documented temperature of 100.9 at the highest, also 
had a loss of appetite.  Denies any nausea, vomiting, abdominal pain.  She said 
there was a small red area around the tick bite that did not have any central 
clearing.  She went to see her PCP this past Monday 07/17/17, regarding these 
symptoms and the tick bite.  She saw Dr. Coto in the clinic.  He did not 
feel that her rash looked like that of lyme disease, but did send off some 
blood work as well as a CBC, as well as a CMP.  Lyme serology was negative 
which is seen in our system right now.  Patient states that she was told to 
take some ibuprofen and try to get some rest.  She felt like over the past few 
days her symptoms seemed to be improving.  She was able to get some sleep and 
her temperature normalized.  However, this morning as soon as she woke up, she 
felt the headache and palpitations.  She felt like her heart rate was irregular 
and had some associated chest heaviness.  During this she called the EMS, was 
brought to the hospital and was found to be in atrial fibrillation on a 12-lead 
EKG.  While in the emergency department, she spontaneously converted on her own 
back to normal sinus rhythm.

 

PAST MEDICAL HISTORY:  Significant for:

1.  Hypertension.

2.  Irregular heart rate, possibly PVC's.

3.  Osteopenia.

4.  Hashimoto's thyroiditis, status post left partial thyroidectomy.

 

PAST SURGICAL HISTORY:

1.  D and C.

2.  Intrauterine polyp removal.

3.  Left partial thyroidectomy.

 

HOME MEDICATIONS:

1.  Metoprolol tartrate 25 mg by mouth daily.

2.  Calcium citrate plus D 1 tablet by mouth daily.

3.  Aspirin 81 mg by mouth at bedtime.

4.  Amlodipine 2.5 mg by mouth at bedtime.

5.  Multivitamin 1 tablet by mouth at bedtime.

 

ALLERGIES:  The patient reports no known drug allergies.

 

FAMILY HISTORY:  Significant for her mother with diabetes, father with heart 
problem.

 

SOCIAL HISTORY:  The patient has no history of tobacco abuse.  Does not use any 
alcohol or illicit drug use.

 

REVIEW OF SYSTEMS:  A 12-point review of systems negative except for that as 
noted in the HPI.

 

                               PHYSICAL EXAMINATION

 

GENERAL:  This patient is a pleasant elderly  female, lying in bed in 
no apparent distress.

 

VITAL SIGNS:  On admission, temperature 99.0, heart rate of 131, respiratory 
rate of 20, O2 saturation 99% on room air, blood pressure 104/51.

 

HEENT:  Head:  Normocephalic, atraumatic.  Eyes:  Pupils equal, round, and 
reactive to light and accommodation.  Anicteric sclerae.  ENT:  Moist mucous 
membranes.  No cervical adenopathy.

 

LUNGS:  Clear to auscultation bilaterally.  No wheezes, rales or rhonchi.

 

CARDIOVASCULAR:  Regular rate and rhythm. Occasional ectopic beat.  No murmurs, 
gallops or rubs.

 

ABDOMEN:  Soft, nontender, nondistended.  Bowel sounds are positive.

 

EXTREMITIES:  No cyanosis, clubbing or edema.

 

SKIN:  The patient has a small erythematous area on her right breast, where she 
states her previous tick was located.  There is no erythema or tenderness.  She 
does have some small erythematous patches on her abdomen without any central 
clearing.

 

 DIAGNOSTIC STUDIES/LAB DATA:  White blood cell count 9.1, hematocrit of 39, 
platelets 239.  INR of 0.97.  Sodium 138, potassium 3.9, chloride of 103, 
carbon dioxide of 30, BUN of 22, creatinine of 0.93, glucose of 120.  Lactic 
acid of 1.1. AST of 56, ALT of 59, alk phos of 142.  CK of 16, CK-MB of 2.2, 
troponin 0.06, B- natriuretic peptide of 131.  TSH of 4.4.

 

Chest x-ray personally reviewed shows no acute disease.  



EKG personally reviewed initially showed atrial fibrillation with heart rate of 
115, with an RSR prime. Subsequent EKG showed normal sinus rhythm, again with 
RSR prime, some T-wave flattening in III and AVF as well as inversion of V3.  

I did not appreciate any significant ST changes.

 

ASSESSMENT AND PLAN:  New onset atrial fibrillation, spontaneously converted, 
with associated chest heaviness and palpitations in an 83-year-old female with 
a past medical history of hypertension, osteopenia, Hashimoto's thyroiditis, 
what sounds like premature ventricular contractions, and tick bite earlier this 
month.

 

1.  Atrial fibrillation.  Patient converted back to normal sinus rhythm without 
any additional medications.  We will continue on her home metoprolol for now. 
Will not start anticoagulation at this time. Ordered an echocardiogram.  We 
will trend the patient's troponins, but I think it is most likely that her mild 
troponin elevation is due to demand ischemia from the atrial fibrillation 
rather than ischemia causing the atrial fibrillation.  TSH is within normal 
limits.  I do not think this is in relation to her previous tick bite.

2.  Tick bite earlier this month.  The patient has Lyme serology sent on 07/17/
17 that was negative.  This was likely about 2 weeks or so after her tick bite.
  She does have some mild LFT elevations.  I will send PCR for anaplasmosis and 
ehrlichiosis as she has been having low grade fevers and flu like symptoms. 
Continue supportive care for now with analgesics and antiinflammatories.

3.  Hypertension.  Continue amlodipine and metoprolol.

4.  Osteopenia.  Patient takes alendronate at home, we will hold this for now.

5.  DVT prophylaxis:  Lovenox subcu.

6.  Code status:  The patient is a full code.

 

TIME SPENT:  Total time spent on this admission 45 minutes, with over half the 
time spent face-to-face with the patient in counseling and coordinating care.

 

[id number]/[facility]– [place] #: [id number]

MTDD No